# Patient Record
Sex: FEMALE | Race: WHITE | NOT HISPANIC OR LATINO | Employment: OTHER | URBAN - METROPOLITAN AREA
[De-identification: names, ages, dates, MRNs, and addresses within clinical notes are randomized per-mention and may not be internally consistent; named-entity substitution may affect disease eponyms.]

---

## 2023-03-29 ENCOUNTER — OFFICE VISIT (OUTPATIENT)
Dept: ORTHOPEDICS | Facility: CLINIC | Age: 73
End: 2023-03-29
Payer: MEDICARE

## 2023-03-29 VITALS — WEIGHT: 185 LBS | HEIGHT: 63 IN | BODY MASS INDEX: 32.78 KG/M2

## 2023-03-29 DIAGNOSIS — M65.331 TRIGGER MIDDLE FINGER OF RIGHT HAND: ICD-10-CM

## 2023-03-29 DIAGNOSIS — G56.03 BILATERAL CARPAL TUNNEL SYNDROME: Primary | ICD-10-CM

## 2023-03-29 PROCEDURE — 99203 PR OFFICE/OUTPT VISIT, NEW, LEVL III, 30-44 MIN: ICD-10-PCS | Mod: ,,, | Performed by: ORTHOPAEDIC SURGERY

## 2023-03-29 PROCEDURE — 99203 OFFICE O/P NEW LOW 30 MIN: CPT | Mod: ,,, | Performed by: ORTHOPAEDIC SURGERY

## 2023-03-29 RX ORDER — SIMVASTATIN 10 MG/1
10 TABLET, FILM COATED ORAL NIGHTLY
COMMUNITY

## 2023-03-29 RX ORDER — CARVEDILOL 25 MG/1
25 TABLET ORAL 2 TIMES DAILY
COMMUNITY

## 2023-03-29 RX ORDER — SPIRONOLACTONE 25 MG/1
12.5 TABLET ORAL DAILY
COMMUNITY

## 2023-03-29 RX ORDER — GUAIFENESIN AND PHENYLEPHRINE HCL 400; 10 MG/1; MG/1
1 TABLET ORAL ONCE AS NEEDED
COMMUNITY

## 2023-03-29 NOTE — PROGRESS NOTES
Chief Complaint:   Chief Complaint   Patient presents with    Left Wrist - Pain    Right Wrist - Pain    Wrist Pain     has been having bilateral wrist pain for about 2 years and has numbness and tingling, and on right hand the middle finger has trigger finger, has tried braces in the past and did not help       Consulting Physician: No ref. provider found    History of present illness:    she  is a pleasant 73 y.o. year old female with complaints of bilateral hand pain, numbness, and tingling.  Right is worse than the left.  It has been ongoing since .  She is also noticed a triggering of the right long finger.  She is tried anti-inflammatory medicines and bracing in the past.  She thinks it may be from her sewing activity.  The pain has begun Tinel wake her up at night.    Past Medical History:   Diagnosis Date    Hypertension        Past Surgical History:   Procedure Laterality Date     SECTION      77,81, 83       Current Outpatient Medications   Medication Sig    aspirin 81 mg Cap Take by mouth.    carvediloL (COREG) 25 MG tablet carvedilol 25 mg tablet    diphenhydrAMINE (BENADRYL) 50 MG tablet Take 50 mg by mouth nightly as needed for Itching.    omega-3s/dha/epa/fish oil/D3 (VITAMIN-D + OMEGA-3 ORAL) Vitamin D   [id number] ONCE WEEKLY    simvastatin (ZOCOR) 10 MG tablet simvastatin 10 mg tablet    spironolactone (ALDACTONE) 25 MG tablet spironolactone 25 mg tablet    turmeric root extract 500 mg Cap Take by mouth 1 (one) time if needed.     No current facility-administered medications for this visit.       Review of patient's allergies indicates:   Allergen Reactions    Iodinated contrast media      Patient states she is allergic to contrast dye (CT/MRI)       History reviewed. No pertinent family history.    Social History     Socioeconomic History    Marital status:    Tobacco Use    Smoking status: Never    Smokeless tobacco: Never   Substance and Sexual Activity    Alcohol use:  "Never    Drug use: Never    Sexual activity: Yes     Partners: Male       Review of Systems:    Constitution:   Denies chills, fever, and sweats.  HENT:   Denies headaches or blurry vision.  Cardiovascular:  Denies chest pain or irregular heart beat.  Respiratory:   Denies cough or shortness of breath.  Gastrointestinal:  Denies abdominal pain, nausea, or vomiting.  Musculoskeletal:   Denies muscle cramps.  Neurological:   Denies dizziness or focal weakness.  Psychiatric/Behavior: Normal mental status.  Hematology/Lymph:  Denies bleeding problem or easy bruising/bleeding.  Skin:    Denies rash or suspicious lesions.    Examination:    Vital Signs:    Vitals:    03/29/23 1017   Weight: 83.9 kg (185 lb)   Height: 5' 3" (1.6 m)   PainSc:   7       Body mass index is 32.77 kg/m².    Constitution:   Well-developed, well nourished patient in no acute distress.  Neurological:   Alert and oriented x 3 and cooperative to examination.     Psychiatric/Behavior: Normal mental status.  Respiratory:   No shortness of breath.  Eyes:    Extraoccular muscles intact  Skin:    No scars, rash or suspicious lesions.    MSK:   Bilateral hand exam shows normal-appearing hand with some mild atrophy to a right thenar eminence.  She is full range of motion.  She is active triggering of her long finger.  She is a positive Phalen's test and a positive carpal tunnel compression test.  Her sensation is intact to light touch.  +radial pulse       Assessment: Bilateral carpal tunnel syndrome    Trigger middle finger of right hand        Plan:  We will get bilateral upper extremity EMGs.  I will see her back after for review    "

## 2023-04-17 ENCOUNTER — APPOINTMENT (OUTPATIENT)
Dept: LAB | Facility: HOSPITAL | Age: 73
End: 2023-04-17
Attending: ORTHOPAEDIC SURGERY
Payer: MEDICARE

## 2023-04-17 ENCOUNTER — OFFICE VISIT (OUTPATIENT)
Dept: ORTHOPEDICS | Facility: CLINIC | Age: 73
End: 2023-04-17
Payer: MEDICARE

## 2023-04-17 DIAGNOSIS — G56.01 CARPAL TUNNEL SYNDROME OF RIGHT WRIST: Primary | ICD-10-CM

## 2023-04-17 DIAGNOSIS — M65.331 TRIGGER MIDDLE FINGER OF RIGHT HAND: ICD-10-CM

## 2023-04-17 LAB
ANION GAP SERPL CALC-SCNC: 8 MEQ/L
BUN SERPL-MCNC: 16.8 MG/DL (ref 9.8–20.1)
CALCIUM SERPL-MCNC: 10 MG/DL (ref 8.4–10.2)
CHLORIDE SERPL-SCNC: 106 MMOL/L (ref 98–107)
CO2 SERPL-SCNC: 27 MMOL/L (ref 23–31)
CREAT SERPL-MCNC: 1 MG/DL (ref 0.55–1.02)
CREAT/UREA NIT SERPL: 17
GFR SERPLBLD CREATININE-BSD FMLA CKD-EPI: 60 MLS/MIN/1.73/M2
GLUCOSE SERPL-MCNC: 98 MG/DL (ref 82–115)
POTASSIUM SERPL-SCNC: 4.5 MMOL/L (ref 3.5–5.1)
SODIUM SERPL-SCNC: 141 MMOL/L (ref 136–145)

## 2023-04-17 PROCEDURE — 93010 ELECTROCARDIOGRAM REPORT: CPT | Mod: ,,, | Performed by: STUDENT IN AN ORGANIZED HEALTH CARE EDUCATION/TRAINING PROGRAM

## 2023-04-17 PROCEDURE — 93005 EKG 12-LEAD: ICD-10-PCS | Mod: ,,, | Performed by: ORTHOPAEDIC SURGERY

## 2023-04-17 PROCEDURE — 93010 EKG 12-LEAD: ICD-10-PCS | Mod: ,,, | Performed by: STUDENT IN AN ORGANIZED HEALTH CARE EDUCATION/TRAINING PROGRAM

## 2023-04-17 PROCEDURE — 99213 PR OFFICE/OUTPT VISIT, EST, LEVL III, 20-29 MIN: ICD-10-PCS | Mod: ,,, | Performed by: ORTHOPAEDIC SURGERY

## 2023-04-17 PROCEDURE — 99213 OFFICE O/P EST LOW 20 MIN: CPT | Mod: ,,, | Performed by: ORTHOPAEDIC SURGERY

## 2023-04-17 PROCEDURE — 93005 ELECTROCARDIOGRAM TRACING: CPT | Mod: ,,, | Performed by: ORTHOPAEDIC SURGERY

## 2023-04-17 RX ORDER — SODIUM CHLORIDE 9 MG/ML
INJECTION, SOLUTION INTRAVENOUS CONTINUOUS
Status: CANCELLED | OUTPATIENT
Start: 2023-04-17

## 2023-04-17 NOTE — H&P (VIEW-ONLY)
Chief Complaint:   Chief Complaint   Patient presents with    Left Hand - Results    Right Hand - Results       Consulting Physician: No ref. provider found    History of present illness:    she  is a pleasant 73 y.o. year old female with complaints of bilateral hand pain, numbness, and tingling.  Right is worse than the left.  It has been ongoing since .  She is also noticed a triggering of the right long finger.  She is tried anti-inflammatory medicines and bracing in the past.  She thinks it may be from her sewing activity.  The pain has begun Tinel wake her up at night.    She returns today status post EMG.    Past Medical History:   Diagnosis Date    Hypertension        Past Surgical History:   Procedure Laterality Date     SECTION      77,81, 83       Current Outpatient Medications   Medication Sig    aspirin 81 mg Cap Take by mouth.    carvediloL (COREG) 25 MG tablet carvedilol 25 mg tablet    diphenhydrAMINE (BENADRYL) 50 MG tablet Take 50 mg by mouth nightly as needed for Itching.    omega-3s/dha/epa/fish oil/D3 (VITAMIN-D + OMEGA-3 ORAL) Vitamin D   [id number] ONCE WEEKLY    simvastatin (ZOCOR) 10 MG tablet simvastatin 10 mg tablet    spironolactone (ALDACTONE) 25 MG tablet spironolactone 25 mg tablet    turmeric root extract 500 mg Cap Take by mouth 1 (one) time if needed.     No current facility-administered medications for this visit.       Review of patient's allergies indicates:   Allergen Reactions    Iodinated contrast media      Patient states she is allergic to contrast dye (CT/MRI)       Family History   Problem Relation Age of Onset    No Known Problems Mother     No Known Problems Father        Social History     Socioeconomic History    Marital status:    Tobacco Use    Smoking status: Never    Smokeless tobacco: Never   Substance and Sexual Activity    Alcohol use: Never    Drug use: Never    Sexual activity: Yes     Partners: Male       Review of  Systems:    Constitution:   Denies chills, fever, and sweats.  HENT:   Denies headaches or blurry vision.  Cardiovascular:  Denies chest pain or irregular heart beat.  Respiratory:   Denies cough or shortness of breath.  Gastrointestinal:  Denies abdominal pain, nausea, or vomiting.  Musculoskeletal:   Denies muscle cramps.  Neurological:   Denies dizziness or focal weakness.  Psychiatric/Behavior: Normal mental status.  Hematology/Lymph:  Denies bleeding problem or easy bruising/bleeding.  Skin:    Denies rash or suspicious lesions.    Examination:    Vital Signs:    There were no vitals filed for this visit.      There is no height or weight on file to calculate BMI.    Constitution:   Well-developed, well nourished patient in no acute distress.  Neurological:   Alert and oriented x 3 and cooperative to examination.     Psychiatric/Behavior: Normal mental status.  Respiratory:   No shortness of breath.  Eyes:    Extraoccular muscles intact  Skin:    No scars, rash or suspicious lesions.    MSK:   Bilateral hand exam shows normal-appearing hand with some mild atrophy to a right thenar eminence.  She is full range of motion.  She is active triggering of her long finger.  She is a positive Phalen's test and a positive carpal tunnel compression test.  Her sensation is intact to light touch.  +radial pulse       Assessment: Carpal tunnel syndrome of right wrist  -     Full code; Standing  -     Place in Outpatient; Standing  -     Case Request Operating Room: RELEASE, CARPAL TUNNEL, RELEASE, TRIGGER FINGER  -     Vital signs; Standing  -     Cleanse with Chlorhexidine (CHG); Standing  -     Diet NPO; Standing  -     Chlorohexidine Gluconate Bath; Standing  -     Basic metabolic panel; Standing  -     EKG 12-lead; Standing    Trigger middle finger of right hand  -     Full code; Standing  -     Place in Outpatient; Standing  -     Case Request Operating Room: RELEASE, CARPAL TUNNEL, RELEASE, TRIGGER FINGER  -     Vital  signs; Standing  -     Cleanse with Chlorhexidine (CHG); Standing  -     Diet NPO; Standing  -     Chlorohexidine Gluconate Bath; Standing  -     Basic metabolic panel; Standing  -     EKG 12-lead; Standing    Other orders  -     0.9%  NaCl infusion  -     IP VTE LOW RISK PATIENT; Standing  -     ceFAZolin (ANCEF) 2 g in dextrose 5 % (D5W) 50 mL IVPB        Plan:  I recommend surgical intervention:  Right carpal tunnel release, long finger trigger release.  We discussed the details of the procedure and expected postoperative course.  We discussed the benefits of surgery which be to decrease her pain and increase her function.  We discussed the risks of surgery which is small but could be significant if she has continued pain, stiffness, infection, or nerve injury.  After discussion she would like to proceed.  Plans for surgery May for

## 2023-04-17 NOTE — PROGRESS NOTES
Chief Complaint:   Chief Complaint   Patient presents with    Left Hand - Results    Right Hand - Results       Consulting Physician: No ref. provider found    History of present illness:    she  is a pleasant 73 y.o. year old female with complaints of bilateral hand pain, numbness, and tingling.  Right is worse than the left.  It has been ongoing since .  She is also noticed a triggering of the right long finger.  She is tried anti-inflammatory medicines and bracing in the past.  She thinks it may be from her sewing activity.  The pain has begun Tinel wake her up at night.    She returns today status post EMG.    Past Medical History:   Diagnosis Date    Hypertension        Past Surgical History:   Procedure Laterality Date     SECTION      77,81, 83       Current Outpatient Medications   Medication Sig    aspirin 81 mg Cap Take by mouth.    carvediloL (COREG) 25 MG tablet carvedilol 25 mg tablet    diphenhydrAMINE (BENADRYL) 50 MG tablet Take 50 mg by mouth nightly as needed for Itching.    omega-3s/dha/epa/fish oil/D3 (VITAMIN-D + OMEGA-3 ORAL) Vitamin D   [id number] ONCE WEEKLY    simvastatin (ZOCOR) 10 MG tablet simvastatin 10 mg tablet    spironolactone (ALDACTONE) 25 MG tablet spironolactone 25 mg tablet    turmeric root extract 500 mg Cap Take by mouth 1 (one) time if needed.     No current facility-administered medications for this visit.       Review of patient's allergies indicates:   Allergen Reactions    Iodinated contrast media      Patient states she is allergic to contrast dye (CT/MRI)       Family History   Problem Relation Age of Onset    No Known Problems Mother     No Known Problems Father        Social History     Socioeconomic History    Marital status:    Tobacco Use    Smoking status: Never    Smokeless tobacco: Never   Substance and Sexual Activity    Alcohol use: Never    Drug use: Never    Sexual activity: Yes     Partners: Male       Review of  Systems:    Constitution:   Denies chills, fever, and sweats.  HENT:   Denies headaches or blurry vision.  Cardiovascular:  Denies chest pain or irregular heart beat.  Respiratory:   Denies cough or shortness of breath.  Gastrointestinal:  Denies abdominal pain, nausea, or vomiting.  Musculoskeletal:   Denies muscle cramps.  Neurological:   Denies dizziness or focal weakness.  Psychiatric/Behavior: Normal mental status.  Hematology/Lymph:  Denies bleeding problem or easy bruising/bleeding.  Skin:    Denies rash or suspicious lesions.    Examination:    Vital Signs:    There were no vitals filed for this visit.      There is no height or weight on file to calculate BMI.    Constitution:   Well-developed, well nourished patient in no acute distress.  Neurological:   Alert and oriented x 3 and cooperative to examination.     Psychiatric/Behavior: Normal mental status.  Respiratory:   No shortness of breath.  Eyes:    Extraoccular muscles intact  Skin:    No scars, rash or suspicious lesions.    MSK:   Bilateral hand exam shows normal-appearing hand with some mild atrophy to a right thenar eminence.  She is full range of motion.  She is active triggering of her long finger.  She is a positive Phalen's test and a positive carpal tunnel compression test.  Her sensation is intact to light touch.  +radial pulse       Assessment: Carpal tunnel syndrome of right wrist  -     Full code; Standing  -     Place in Outpatient; Standing  -     Case Request Operating Room: RELEASE, CARPAL TUNNEL, RELEASE, TRIGGER FINGER  -     Vital signs; Standing  -     Cleanse with Chlorhexidine (CHG); Standing  -     Diet NPO; Standing  -     Chlorohexidine Gluconate Bath; Standing  -     Basic metabolic panel; Standing  -     EKG 12-lead; Standing    Trigger middle finger of right hand  -     Full code; Standing  -     Place in Outpatient; Standing  -     Case Request Operating Room: RELEASE, CARPAL TUNNEL, RELEASE, TRIGGER FINGER  -     Vital  signs; Standing  -     Cleanse with Chlorhexidine (CHG); Standing  -     Diet NPO; Standing  -     Chlorohexidine Gluconate Bath; Standing  -     Basic metabolic panel; Standing  -     EKG 12-lead; Standing    Other orders  -     0.9%  NaCl infusion  -     IP VTE LOW RISK PATIENT; Standing  -     ceFAZolin (ANCEF) 2 g in dextrose 5 % (D5W) 50 mL IVPB        Plan:  I recommend surgical intervention:  Right carpal tunnel release, long finger trigger release.  We discussed the details of the procedure and expected postoperative course.  We discussed the benefits of surgery which be to decrease her pain and increase her function.  We discussed the risks of surgery which is small but could be significant if she has continued pain, stiffness, infection, or nerve injury.  After discussion she would like to proceed.  Plans for surgery May for

## 2023-05-03 ENCOUNTER — ANESTHESIA EVENT (OUTPATIENT)
Dept: SURGERY | Facility: HOSPITAL | Age: 73
End: 2023-05-03
Payer: MEDICARE

## 2023-05-04 ENCOUNTER — ANESTHESIA (OUTPATIENT)
Dept: SURGERY | Facility: HOSPITAL | Age: 73
End: 2023-05-04
Payer: MEDICARE

## 2023-05-04 ENCOUNTER — HOSPITAL ENCOUNTER (OUTPATIENT)
Facility: HOSPITAL | Age: 73
Discharge: HOME | End: 2023-05-04
Attending: ORTHOPAEDIC SURGERY | Admitting: ORTHOPAEDIC SURGERY
Payer: MEDICARE

## 2023-05-04 DIAGNOSIS — G56.01 CARPAL TUNNEL SYNDROME OF RIGHT WRIST: Primary | ICD-10-CM

## 2023-05-04 DIAGNOSIS — M65.331 TRIGGER MIDDLE FINGER OF RIGHT HAND: ICD-10-CM

## 2023-05-04 PROCEDURE — 64415 NJX AA&/STRD BRCH PLXS IMG: CPT | Mod: 59,RT,, | Performed by: STUDENT IN AN ORGANIZED HEALTH CARE EDUCATION/TRAINING PROGRAM

## 2023-05-04 PROCEDURE — D9220A PRA ANESTHESIA: Mod: CRNA,,,

## 2023-05-04 PROCEDURE — 36000706: Performed by: ORTHOPAEDIC SURGERY

## 2023-05-04 PROCEDURE — 26055 PR INCISE FINGER TENDON SHEATH: ICD-10-PCS | Mod: F7,,, | Performed by: ORTHOPAEDIC SURGERY

## 2023-05-04 PROCEDURE — D9220A PRA ANESTHESIA: ICD-10-PCS | Mod: CRNA,,,

## 2023-05-04 PROCEDURE — 25000003 PHARM REV CODE 250

## 2023-05-04 PROCEDURE — D9220A PRA ANESTHESIA: ICD-10-PCS | Mod: ANES,,, | Performed by: STUDENT IN AN ORGANIZED HEALTH CARE EDUCATION/TRAINING PROGRAM

## 2023-05-04 PROCEDURE — 64721 CARPAL TUNNEL SURGERY: CPT | Mod: 51,RT,, | Performed by: ORTHOPAEDIC SURGERY

## 2023-05-04 PROCEDURE — 37000009 HC ANESTHESIA EA ADD 15 MINS: Performed by: ORTHOPAEDIC SURGERY

## 2023-05-04 PROCEDURE — 37000008 HC ANESTHESIA 1ST 15 MINUTES: Performed by: ORTHOPAEDIC SURGERY

## 2023-05-04 PROCEDURE — 71000016 HC POSTOP RECOV ADDL HR: Performed by: ORTHOPAEDIC SURGERY

## 2023-05-04 PROCEDURE — 71000015 HC POSTOP RECOV 1ST HR: Performed by: ORTHOPAEDIC SURGERY

## 2023-05-04 PROCEDURE — D9220A PRA ANESTHESIA: Mod: ANES,,, | Performed by: STUDENT IN AN ORGANIZED HEALTH CARE EDUCATION/TRAINING PROGRAM

## 2023-05-04 PROCEDURE — 26055 INCISE FINGER TENDON SHEATH: CPT | Mod: F7,,, | Performed by: ORTHOPAEDIC SURGERY

## 2023-05-04 PROCEDURE — 64415 RIGHT SINGLE SHOT SUPRACLAVICULAR BLOCK: ICD-10-PCS | Mod: 59,RT,, | Performed by: STUDENT IN AN ORGANIZED HEALTH CARE EDUCATION/TRAINING PROGRAM

## 2023-05-04 PROCEDURE — 63600175 PHARM REV CODE 636 W HCPCS: Performed by: STUDENT IN AN ORGANIZED HEALTH CARE EDUCATION/TRAINING PROGRAM

## 2023-05-04 PROCEDURE — 36000707: Performed by: ORTHOPAEDIC SURGERY

## 2023-05-04 PROCEDURE — 64721 PR REVISE MEDIAN N/CARPAL TUNNEL SURG: ICD-10-PCS | Mod: 51,RT,, | Performed by: ORTHOPAEDIC SURGERY

## 2023-05-04 PROCEDURE — 63600175 PHARM REV CODE 636 W HCPCS

## 2023-05-04 PROCEDURE — 64415 NJX AA&/STRD BRCH PLXS IMG: CPT | Mod: 59 | Performed by: STUDENT IN AN ORGANIZED HEALTH CARE EDUCATION/TRAINING PROGRAM

## 2023-05-04 RX ORDER — CEFAZOLIN SODIUM 1 G/3ML
INJECTION, POWDER, FOR SOLUTION INTRAMUSCULAR; INTRAVENOUS
Status: DISCONTINUED | OUTPATIENT
Start: 2023-05-04 | End: 2023-05-04

## 2023-05-04 RX ORDER — MIDAZOLAM HYDROCHLORIDE 1 MG/ML
2 INJECTION INTRAMUSCULAR; INTRAVENOUS EVERY 5 MIN PRN
Status: DISCONTINUED | OUTPATIENT
Start: 2023-05-04 | End: 2023-05-04 | Stop reason: HOSPADM

## 2023-05-04 RX ORDER — CEFAZOLIN SODIUM 1 G/3ML
INJECTION, POWDER, FOR SOLUTION INTRAMUSCULAR; INTRAVENOUS
Status: COMPLETED
Start: 2023-05-04 | End: 2023-05-04

## 2023-05-04 RX ORDER — MORPHINE SULFATE 4 MG/ML
3 INJECTION, SOLUTION INTRAMUSCULAR; INTRAVENOUS
Status: DISCONTINUED | OUTPATIENT
Start: 2023-05-04 | End: 2023-05-04 | Stop reason: HOSPADM

## 2023-05-04 RX ORDER — PROPOFOL 10 MG/ML
VIAL (ML) INTRAVENOUS CONTINUOUS PRN
Status: DISCONTINUED | OUTPATIENT
Start: 2023-05-04 | End: 2023-05-04

## 2023-05-04 RX ORDER — ROPIVACAINE HYDROCHLORIDE 5 MG/ML
INJECTION, SOLUTION EPIDURAL; INFILTRATION; PERINEURAL
Status: DISCONTINUED
Start: 2023-05-04 | End: 2023-05-04 | Stop reason: WASHOUT

## 2023-05-04 RX ORDER — SODIUM CHLORIDE 0.9 % (FLUSH) 0.9 %
3 SYRINGE (ML) INJECTION EVERY 6 HOURS PRN
Status: DISCONTINUED | OUTPATIENT
Start: 2023-05-04 | End: 2023-05-04 | Stop reason: HOSPADM

## 2023-05-04 RX ORDER — KETOROLAC TROMETHAMINE 10 MG/1
10 TABLET, FILM COATED ORAL 3 TIMES DAILY
Qty: 15 TABLET | Refills: 0 | Status: ON HOLD | OUTPATIENT
Start: 2023-05-04 | End: 2023-06-01 | Stop reason: HOSPADM

## 2023-05-04 RX ORDER — SODIUM CHLORIDE 9 MG/ML
INJECTION, SOLUTION INTRAVENOUS CONTINUOUS
Status: DISCONTINUED | OUTPATIENT
Start: 2023-05-04 | End: 2023-05-04 | Stop reason: HOSPADM

## 2023-05-04 RX ORDER — ONDANSETRON 2 MG/ML
4 INJECTION INTRAMUSCULAR; INTRAVENOUS EVERY 12 HOURS PRN
Status: DISCONTINUED | OUTPATIENT
Start: 2023-05-04 | End: 2023-05-04 | Stop reason: HOSPADM

## 2023-05-04 RX ORDER — TRAMADOL HYDROCHLORIDE 50 MG/1
50 TABLET ORAL EVERY 4 HOURS PRN
Status: DISCONTINUED | OUTPATIENT
Start: 2023-05-04 | End: 2023-05-04 | Stop reason: HOSPADM

## 2023-05-04 RX ORDER — PROPOFOL 10 MG/ML
INJECTION, EMULSION INTRAVENOUS
Status: DISCONTINUED | OUTPATIENT
Start: 2023-05-04 | End: 2023-05-04

## 2023-05-04 RX ORDER — ONDANSETRON 2 MG/ML
INJECTION INTRAMUSCULAR; INTRAVENOUS
Status: DISCONTINUED | OUTPATIENT
Start: 2023-05-04 | End: 2023-05-04

## 2023-05-04 RX ORDER — DEXAMETHASONE SODIUM PHOSPHATE 4 MG/ML
INJECTION, SOLUTION INTRA-ARTICULAR; INTRALESIONAL; INTRAMUSCULAR; INTRAVENOUS; SOFT TISSUE
Status: DISCONTINUED | OUTPATIENT
Start: 2023-05-04 | End: 2023-05-04

## 2023-05-04 RX ORDER — HYDROCODONE BITARTRATE AND ACETAMINOPHEN 5; 325 MG/1; MG/1
1 TABLET ORAL EVERY 4 HOURS PRN
Status: DISCONTINUED | OUTPATIENT
Start: 2023-05-04 | End: 2023-05-04 | Stop reason: HOSPADM

## 2023-05-04 RX ADMIN — MIDAZOLAM HYDROCHLORIDE 2 MG: 1 INJECTION, SOLUTION INTRAMUSCULAR; INTRAVENOUS at 08:05

## 2023-05-04 RX ADMIN — CEFAZOLIN 2 G: 330 INJECTION, POWDER, FOR SOLUTION INTRAMUSCULAR; INTRAVENOUS at 09:05

## 2023-05-04 RX ADMIN — MEPIVACAINE HYDROCHLORIDE 30 ML: 15 INJECTION, SOLUTION EPIDURAL; INFILTRATION at 09:05

## 2023-05-04 RX ADMIN — ONDANSETRON HYDROCHLORIDE 4 MG: 2 SOLUTION INTRAMUSCULAR; INTRAVENOUS at 09:05

## 2023-05-04 RX ADMIN — PROPOFOL 125 MCG/KG/MIN: 10 INJECTION, EMULSION INTRAVENOUS at 09:05

## 2023-05-04 RX ADMIN — SODIUM CHLORIDE, SODIUM GLUCONATE, SODIUM ACETATE, POTASSIUM CHLORIDE AND MAGNESIUM CHLORIDE: 526; 502; 368; 37; 30 INJECTION, SOLUTION INTRAVENOUS at 09:05

## 2023-05-04 RX ADMIN — DEXAMETHASONE SODIUM PHOSPHATE 4 MG: 4 INJECTION, SOLUTION INTRA-ARTICULAR; INTRALESIONAL; INTRAMUSCULAR; INTRAVENOUS; SOFT TISSUE at 09:05

## 2023-05-04 RX ADMIN — PROPOFOL 40 MG: 10 INJECTION, EMULSION INTRAVENOUS at 09:05

## 2023-05-04 NOTE — ANESTHESIA PREPROCEDURE EVALUATION
2023  Esperanza Ochoa is a 73 y.o., female.    Other Medical History   Hypertension Insomnia   High cholesterol Carpal tunnel syndrome   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, bilateral      Surgical History   SECTION COLONOSCOPY       Pre-op Assessment    I have reviewed the Patient Summary Reports.     I have reviewed the Nursing Notes. I have reviewed the NPO Status.   I have reviewed the Medications.     Review of Systems  Anesthesia Hx:   Denies Personal Hx of Anesthesia complications.   Cardiovascular:   Hypertension hyperlipidemia    Pulmonary:  Pulmonary Normal    Hepatic/GI:  Hepatic/GI Normal    Musculoskeletal:   Arthritis     Neurological:   Neuromuscular Disease, (cts)    Endocrine:  Obesity / BMI > 30  Psych:  Psychiatric Normal           Physical Exam  General: Well nourished, Cooperative and Alert    Airway:  Mallampati: II   Mouth Opening: Normal  TM Distance: Normal  Tongue: Normal    Chest/Lungs:  Clear to auscultation    Heart:  Rate: Normal        Anesthesia Plan  Type of Anesthesia, risks & benefits discussed:    Anesthesia Type: Regional  Intra-op Monitoring Plan: Standard ASA Monitors  Post Op Pain Control Plan: multimodal analgesia and peripheral nerve block  Induction:  IV  Informed Consent: Informed consent signed with the Patient and all parties understand the risks and agree with anesthesia plan.  All questions answered.   ASA Score: 2  Day of Surgery Review of History & Physical: H&P Update referred to the surgeon/provider.    Ready For Surgery From Anesthesia Perspective.     .

## 2023-05-04 NOTE — ANESTHESIA POSTPROCEDURE EVALUATION
Anesthesia Post Evaluation    Patient: Esperanza Ochoa    Procedure(s) Performed: Procedure(s) (LRB):  RELEASE, CARPAL TUNNEL (Right)  RELEASE, TRIGGER FINGER (Right)    Final Anesthesia Type: regional      Patient location during evaluation: PACU  Patient participation: Yes- Able to Participate  Level of consciousness: awake and alert  Post-procedure vital signs: reviewed and stable  Pain management: adequate  Airway patency: patent    PONV status at discharge: No PONV  Anesthetic complications: no      Respiratory status: unassisted  Hydration status: euvolemic  Follow-up not needed.          Vitals Value Taken Time   /82 05/04/23 1131   Temp 35.9 °C (96.6 °F) 05/04/23 1100   Pulse 60 05/04/23 1137   Resp 18 05/04/23 1115   SpO2 95 % 05/04/23 1137   Vitals shown include unvalidated device data.      No case tracking events are documented in the log.      Pain/Marta Score: Modified Marta Score: 19 (5/4/2023 11:50 AM)

## 2023-05-04 NOTE — TRANSFER OF CARE
"Anesthesia Transfer of Care Note    Patient: Esperanza Ochoa    Procedure(s) Performed: Procedure(s) (LRB):  RELEASE, CARPAL TUNNEL (Right)  RELEASE, TRIGGER FINGER (Right)    Patient location: OPS    Anesthesia Type: general    Transport from OR: Transported from OR on 2-3 L/min O2 by NC with adequate spontaneous ventilation    Post pain: adequate analgesia    Post assessment: no apparent anesthetic complications    Post vital signs: stable    Level of consciousness: awake    Nausea/Vomiting: no nausea/vomiting    Complications: none    Transfer of care protocol was followedComments: Detailed report with handoff to licensed provider complete      Last vitals:   Visit Vitals  /82   Pulse 89   Temp 35.8 °C (96.5 °F) (Tympanic)   Resp 15   Ht 5' 3" (1.6 m)   Wt 85.9 kg (189 lb 6 oz)   SpO2 99%   Breastfeeding No   BMI 33.55 kg/m²     "

## 2023-05-04 NOTE — OR NURSING
0858  procedure time out performed for rt supraclav block, all agree  0901  started  0904 U/s guided Rt supraclav block completed, pt kiki well, vss, resp full and regular, continuous monitoring.

## 2023-05-04 NOTE — PLAN OF CARE
Pt ready for discharge. Pt tolerated procedure well.  Problem: Adult Inpatient Plan of Care  Goal: Plan of Care Review  Outcome: Met  Goal: Patient-Specific Goal (Individualized)  Outcome: Met  Goal: Absence of Hospital-Acquired Illness or Injury  Outcome: Met  Goal: Optimal Comfort and Wellbeing  Outcome: Met  Goal: Readiness for Transition of Care  Outcome: Met

## 2023-05-04 NOTE — PATIENT INSTRUCTIONS
[facility]  Omer Mcduffie Jr., MD  [address], [address]  [place] [address]  [phone], fax: [phone]    CARPAL TUNNEL RELEASE    DIET:  Following general anesthesia, start with clear liquids to decrease chances of nausea.  Begin with water, coffee, tea, ginger ale, sprite, or apple juice.  If tolerated, advance to Jell-o, soup, crackers, or toast.  Once these are tolerated, advance to a regular diet.    DRESSING:  Keep the splint clean and dry.  Do not remove the splint until the first follow up appointment.    SHOWERING:  You may shower after surgery.  Keep the splint clean and dry during showers.    ACTIVITY:            -  Elevate the surgical site as upright as possible using extra pillows as needed.  Do this for the first few days after surgery to help decrease pain and swelling.            -  Ice should be applied to the surgical site for 30 minutes, 5-6 times per day, for the 1st week to help decrease pain and swelling.      PAIN MEDICATION:       -  Use the Percocet as prescribed for pain after surgery.  Pain medicine can cause nausea and vomiting, especially on an empty stomach.       -  In addition, you can take Ketorolac as prescribed or Iburpofen 200 mg, 4 pills every 8 hours.  Ketorolac or Iburpofen medicine can irritate your stomach or cause heartburn.  If this happens to you, stop taking the medicine.       -  Ice and elevation are more useful than pain medicine for surgical pain.  If you are having too much pain or discomfort, try more ice and higher elevation.       -  Do NOT drink alcohol while taking pain medication.    BLOOD CLOT PREVENTION:  If you are aware that you are at high risk for blood blots, notify your physician.  In general, you should walk s much as possible after surgery to increase blood circulation throughout your body.     DRIVING OR FLYING:  In general, you should NOT drive while wearing a sling  You must NEVER drive while  taking narcotic pain medication  You may ride in a car, take a train, or fly once you feel comfortable    PHYSICAL THERAPY:  You will not start physical therapy until after your first follow up appointment.    WORK OR SCHOOL:  You may return to an office job or school whenver comfortable.  Most patients return ~ 1 week after surgery.  For more active jobs that require extended walking, squatting, or lifting, you can wait until after your follow up appointment.  Any other types of jobs should be discussed with Dr. Mcduffie to determine a date for return to work.    PROBLEMS TO REPORT:       1.  Fever greater than 102 F       2.  Incision that is very red and/or draining pus       3.  Unable to urinate within 8 hours of surgery (a rare effect of being put to sleep for surgery)  Calls should be directed to the clinic:  [phone]    RETURN APPOINTMENT:  To confirm or reschedule your appointment, call [phone]

## 2023-05-04 NOTE — ANESTHESIA PROCEDURE NOTES
Right single shot supraclavicular block    Patient location during procedure: pre-op   Block not for primary anesthetic.  Reason for block: at surgeon's request and post-op pain management   Post-op Pain Location: right wrist pain   Start time: 5/4/2023 9:04 AM  Timeout: 5/4/2023 9:04 AM   End time: 5/4/2023 9:06 AM    Staffing  Authorizing Provider: Ashish Garcia MD  Performing Provider: Ashish Garcia MD    Preanesthetic Checklist  Completed: patient identified, IV checked, site marked, risks and benefits discussed, surgical consent, monitors and equipment checked, pre-op evaluation and timeout performed  Peripheral Block  Patient position: supine  Prep: ChloraPrep  Patient monitoring: heart rate, cardiac monitor, continuous pulse ox, continuous capnometry and frequent blood pressure checks  Block type: supraclavicular  Laterality: right  Injection technique: single shot  Needle  Needle type: Stimuplex   Needle gauge: 20 G  Needle length: 4 in  Needle localization: anatomical landmarks and ultrasound guidance   -ultrasound image captured on disc.  Assessment  Injection assessment: negative aspiration, negative parasthesia and local visualized surrounding nerve  Paresthesia pain: none  Heart rate change: no  Slow fractionated injection: yes    Medications:    Medications: mepivacaine (CARBOCAINE) injection 15 mg/mL (1.5%) - Perineural   30 mL - 5/4/2023 9:06:00 AM    Additional Notes  Good view of artery and first rib.  Needle guided adjacent to artery via corner pocket technique and local dosed without paresthesias.  Needle then moved anteriorly to plexus and local again dosed without paresthesias.  No complications.  Good perineural spread noted.  VSS.  [facility] RN monitoring vitals throughout procedure.  Patient tolerated procedure well.

## 2023-05-04 NOTE — DISCHARGE SUMMARY
[facility] - Periop Services  Discharge Note  Short Stay    Procedure(s) (LRB):  RELEASE, CARPAL TUNNEL (Right)  RELEASE, TRIGGER FINGER (Right)      OUTCOME: Patient tolerated treatment/procedure well without complication and is now ready for discharge.    DISPOSITION: Home or Self Care    FINAL DIAGNOSIS:  <principal problem not specified>    FOLLOWUP: In clinic    DISCHARGE INSTRUCTIONS:  No discharge procedures on file.     TIME SPENT ON DISCHARGE: 10 minutes

## 2023-05-04 NOTE — OP NOTE
Date of Service: 05/04/2023    Preoperative diagnosis: Right Carpal tunnel syndrome, long finger trigger    Postoperative diagnosis: Right Carpal tunnel syndrome, long finger trigger    Attending surgeon: Omer Mcduffie MD    Assistant: Cherry Angulo, nurse practitioner.  Ms. Angulo was essential in manipulating the hand while perform the release, retraction and nerve protection, and closure.    Procedure: Right Carpal tunnel release, long finger trigger release     Anesthesia: General plus monitored care    Estimated blood loss: Minimal    Tourniquet time:  20 Minutes    Complications: None    History of present illness: Esperanza is a pleasant 73 y.o.-year-old who has had persisted numbness tingling and shooting pain into the hand.  Subjective and objective signs of carpal tunnel syndrome were observed.  After failing conservative measures, I recommended open carpal tunnel release.  The risks, benefits, and alternatives to therapy were presented.  The patient elects to proceed    Procedure: Esperanza was initially seen in the preoperative unit where a history and physical were reviewed without change.  The operative extremity was marked.  Consents were reviewed.  All questions were answered.  The patient was then taken to the operating room placed supine on the operative table where monitored care was undertaken.  I injected local anesthesia around the incision.  Perioperative antibiotics were administered.  The operative extremity was prepped and draped in sterile fashion.  An attending lead timeout confirmed the operative side; I then began the procedure.    I started with the trigger finger release.  I made an incision in line with the long finger.  I sharply incised through skin and spread through subcutaneous tissue.  I was able to identify the A1 pulley and split in line with the tendon.  I inspected the tendon and it was free and intact.  I then closed the skin.    I began my incision at the intersection of  Booth's line and the radial side of the ring finger.  I sharply incised through skin and subcutaneous tissue.  I split through the palmar fascia.  I then came down to the transverse carpal ligament.  I first incised this with a 15 blade.  I cleaned the underside of the ligament using a freer elevator.  I then completed the incision using scissors while visualizing the nerve.  I incised it distally until I encountered yellow fat.  I incised proximally until I was in the forearm fascia.  I inspected the nerve and there was no lesions.  There was no abnormal contents of the carpal tunnel.  I irrigated out the incision.  I closed the incision with nylon sutures.  The patient was placed into a resting hand splint.  The patient was awoken from anesthesia and transferred to the postop unit good condition.

## 2023-05-04 NOTE — DISCHARGE SUMMARY
[facility] Orthopaedics - Periop Services  Discharge Note  Short Stay    Procedure(s) (LRB):  RELEASE, CARPAL TUNNEL (Right)  RELEASE, TRIGGER FINGER (Right)      OUTCOME: Patient tolerated treatment/procedure well without complication and is now ready for discharge.    DISPOSITION: Home or Self Care    FINAL DIAGNOSIS:  <principal problem not specified>    FOLLOWUP: In clinic    DISCHARGE INSTRUCTIONS:    Discharge Procedure Orders   Diet general     Keep surgical extremity elevated     Ice to affected area     Remove dressing in 72 hours     Call MD for:  temperature >100.4     Call MD for:  persistent nausea and vomiting     Call MD for:  severe uncontrolled pain     Call MD for:  difficulty breathing, headache or visual disturbances     Call MD for:  redness, tenderness, or signs of infection (pain, swelling, redness, odor or green/yellow discharge around incision site)     Call MD for:  hives     Call MD for:  persistent dizziness or light-headedness     Call MD for:  extreme fatigue     Activity as tolerated     Weight bearing as tolerated        TIME SPENT ON DISCHARGE: 10 minutes

## 2023-05-05 VITALS
DIASTOLIC BLOOD PRESSURE: 92 MMHG | HEIGHT: 63 IN | WEIGHT: 189.38 LBS | SYSTOLIC BLOOD PRESSURE: 131 MMHG | OXYGEN SATURATION: 95 % | HEART RATE: 61 BPM | TEMPERATURE: 97 F | RESPIRATION RATE: 18 BRPM | BODY MASS INDEX: 33.55 KG/M2

## 2023-05-15 ENCOUNTER — OFFICE VISIT (OUTPATIENT)
Dept: ORTHOPEDICS | Facility: CLINIC | Age: 73
End: 2023-05-15
Payer: MEDICARE

## 2023-05-15 VITALS — BODY MASS INDEX: 33.49 KG/M2 | WEIGHT: 189 LBS | HEIGHT: 63 IN

## 2023-05-15 DIAGNOSIS — Z98.890 S/P TRIGGER FINGER RELEASE: ICD-10-CM

## 2023-05-15 DIAGNOSIS — Z98.890 S/P CARPAL TUNNEL RELEASE: Primary | ICD-10-CM

## 2023-05-15 DIAGNOSIS — G56.02 LEFT CARPAL TUNNEL SYNDROME: ICD-10-CM

## 2023-05-15 PROCEDURE — 99024 PR POST-OP FOLLOW-UP VISIT: ICD-10-PCS | Mod: POP,,, | Performed by: NURSE PRACTITIONER

## 2023-05-15 PROCEDURE — 99024 POSTOP FOLLOW-UP VISIT: CPT | Mod: POP,,, | Performed by: NURSE PRACTITIONER

## 2023-05-15 RX ORDER — SODIUM CHLORIDE 9 MG/ML
INJECTION, SOLUTION INTRAVENOUS CONTINUOUS
Status: CANCELLED | OUTPATIENT
Start: 2023-05-15

## 2023-05-15 NOTE — H&P (VIEW-ONLY)
Chief Complaint:   Chief Complaint   Patient presents with    Right Wrist - Post-op Evaluation    Right Hand - Post-op Evaluation    Post-op Evaluation     2 wk sp right carpal tunnel release and right long trigger finer release sx 5/4/23 GL 8/2/23, patient presents today in splint and has no complaints       History of present illness:  5/4/23: Right Carpal tunnel release, long finger trigger release    She returns today. Her Numbness, tingling, and trigger finger have resolved.  She denies pain.    She would like to schedule a left carpal tunnel release.    Musculoskeletal:   Right hand incisions healing.  Without erythema, drainage, or signs of infection.  Distally neurovascular intact.    Left hand numbness and tingling in the thumb through long fingers.  +Tinel's and carpal tunnel compression.    Assessment: S/P carpal tunnel release    S/P trigger finger release    Left carpal tunnel syndrome        Plan:  Right hand doing well status post above. Sutures out today. Discussed with Dr. Mcduffie - kyle for left carpal tunnel release. We discussed the details of the procedure and expected postoperative course.  We discussed the benefits of surgery which be to decrease her pain and increase her function.  We discussed the risks of surgery which is small but could be significant if she has continued pain, stiffness, infection, or nerve injury. After discussion she would like to proceed. Plan for surgery June 1st.

## 2023-05-31 ENCOUNTER — ANESTHESIA EVENT (OUTPATIENT)
Dept: SURGERY | Facility: HOSPITAL | Age: 73
End: 2023-05-31
Payer: MEDICARE

## 2023-06-01 ENCOUNTER — HOSPITAL ENCOUNTER (OUTPATIENT)
Facility: HOSPITAL | Age: 73
Discharge: HOME | End: 2023-06-01
Attending: ORTHOPAEDIC SURGERY | Admitting: ORTHOPAEDIC SURGERY
Payer: MEDICARE

## 2023-06-01 ENCOUNTER — ANESTHESIA (OUTPATIENT)
Dept: SURGERY | Facility: HOSPITAL | Age: 73
End: 2023-06-01
Payer: MEDICARE

## 2023-06-01 VITALS
WEIGHT: 188.94 LBS | OXYGEN SATURATION: 100 % | TEMPERATURE: 98 F | HEIGHT: 63 IN | DIASTOLIC BLOOD PRESSURE: 77 MMHG | BODY MASS INDEX: 33.48 KG/M2 | RESPIRATION RATE: 20 BRPM | HEART RATE: 60 BPM | SYSTOLIC BLOOD PRESSURE: 121 MMHG

## 2023-06-01 DIAGNOSIS — G56.02 LEFT CARPAL TUNNEL SYNDROME: Primary | ICD-10-CM

## 2023-06-01 DIAGNOSIS — I83.93 VARICOSE VEINS OF BOTH LOWER EXTREMITIES WITHOUT ULCER OR INFLAMMATION: Primary | ICD-10-CM

## 2023-06-01 DIAGNOSIS — Z98.890 S/P CARPAL TUNNEL RELEASE: ICD-10-CM

## 2023-06-01 PROCEDURE — 63600175 PHARM REV CODE 636 W HCPCS: Performed by: NURSE PRACTITIONER

## 2023-06-01 PROCEDURE — D9220A PRA ANESTHESIA: ICD-10-PCS | Mod: CRNA,,, | Performed by: NURSE ANESTHETIST, CERTIFIED REGISTERED

## 2023-06-01 PROCEDURE — 25000003 PHARM REV CODE 250: Performed by: NURSE PRACTITIONER

## 2023-06-01 PROCEDURE — 25000003 PHARM REV CODE 250: Performed by: ORTHOPAEDIC SURGERY

## 2023-06-01 PROCEDURE — 25000003 PHARM REV CODE 250: Performed by: ANESTHESIOLOGY

## 2023-06-01 PROCEDURE — 36000707: Performed by: ORTHOPAEDIC SURGERY

## 2023-06-01 PROCEDURE — 36000706: Performed by: ORTHOPAEDIC SURGERY

## 2023-06-01 PROCEDURE — 64721 PR REVISE MEDIAN N/CARPAL TUNNEL SURG: ICD-10-PCS | Mod: 79,LT,, | Performed by: ORTHOPAEDIC SURGERY

## 2023-06-01 PROCEDURE — 71000015 HC POSTOP RECOV 1ST HR: Performed by: ORTHOPAEDIC SURGERY

## 2023-06-01 PROCEDURE — 64721 CARPAL TUNNEL SURGERY: CPT | Mod: 79,LT,, | Performed by: ORTHOPAEDIC SURGERY

## 2023-06-01 PROCEDURE — D9220A PRA ANESTHESIA: Mod: CRNA,,, | Performed by: NURSE ANESTHETIST, CERTIFIED REGISTERED

## 2023-06-01 PROCEDURE — 25000003 PHARM REV CODE 250: Performed by: NURSE ANESTHETIST, CERTIFIED REGISTERED

## 2023-06-01 PROCEDURE — D9220A PRA ANESTHESIA: ICD-10-PCS | Mod: ANES,,, | Performed by: ANESTHESIOLOGY

## 2023-06-01 PROCEDURE — 37000008 HC ANESTHESIA 1ST 15 MINUTES: Performed by: ORTHOPAEDIC SURGERY

## 2023-06-01 PROCEDURE — 63600175 PHARM REV CODE 636 W HCPCS: Performed by: NURSE ANESTHETIST, CERTIFIED REGISTERED

## 2023-06-01 PROCEDURE — D9220A PRA ANESTHESIA: Mod: ANES,,, | Performed by: ANESTHESIOLOGY

## 2023-06-01 PROCEDURE — 37000009 HC ANESTHESIA EA ADD 15 MINS: Performed by: ORTHOPAEDIC SURGERY

## 2023-06-01 RX ORDER — MORPHINE SULFATE 4 MG/ML
3 INJECTION, SOLUTION INTRAMUSCULAR; INTRAVENOUS
Status: DISCONTINUED | OUTPATIENT
Start: 2023-06-01 | End: 2023-06-01 | Stop reason: HOSPADM

## 2023-06-01 RX ORDER — TRAMADOL HYDROCHLORIDE 50 MG/1
50 TABLET ORAL EVERY 4 HOURS PRN
Status: DISCONTINUED | OUTPATIENT
Start: 2023-06-01 | End: 2023-06-01 | Stop reason: HOSPADM

## 2023-06-01 RX ORDER — HYDROCODONE BITARTRATE AND ACETAMINOPHEN 5; 325 MG/1; MG/1
1 TABLET ORAL EVERY 4 HOURS PRN
Status: DISCONTINUED | OUTPATIENT
Start: 2023-06-01 | End: 2023-06-01 | Stop reason: HOSPADM

## 2023-06-01 RX ORDER — SODIUM CHLORIDE, SODIUM GLUCONATE, SODIUM ACETATE, POTASSIUM CHLORIDE AND MAGNESIUM CHLORIDE 30; 37; 368; 526; 502 MG/100ML; MG/100ML; MG/100ML; MG/100ML; MG/100ML
INJECTION, SOLUTION INTRAVENOUS CONTINUOUS
Status: DISCONTINUED | OUTPATIENT
Start: 2023-06-01 | End: 2023-06-01 | Stop reason: HOSPADM

## 2023-06-01 RX ORDER — PROPOFOL 10 MG/ML
VIAL (ML) INTRAVENOUS CONTINUOUS PRN
Status: DISCONTINUED | OUTPATIENT
Start: 2023-06-01 | End: 2023-06-01

## 2023-06-01 RX ORDER — ONDANSETRON 2 MG/ML
4 INJECTION INTRAMUSCULAR; INTRAVENOUS EVERY 12 HOURS PRN
Status: DISCONTINUED | OUTPATIENT
Start: 2023-06-01 | End: 2023-06-01 | Stop reason: HOSPADM

## 2023-06-01 RX ORDER — LIDOCAINE HYDROCHLORIDE 10 MG/ML
INJECTION INFILTRATION; PERINEURAL
Status: DISCONTINUED | OUTPATIENT
Start: 2023-06-01 | End: 2023-06-01 | Stop reason: HOSPADM

## 2023-06-01 RX ORDER — MIDAZOLAM HYDROCHLORIDE 1 MG/ML
INJECTION INTRAMUSCULAR; INTRAVENOUS
Status: DISCONTINUED | OUTPATIENT
Start: 2023-06-01 | End: 2023-06-01

## 2023-06-01 RX ORDER — BUPIVACAINE HYDROCHLORIDE 2.5 MG/ML
INJECTION, SOLUTION EPIDURAL; INFILTRATION; INTRACAUDAL
Status: DISCONTINUED
Start: 2023-06-01 | End: 2023-06-01 | Stop reason: WASHOUT

## 2023-06-01 RX ORDER — KETOROLAC TROMETHAMINE 10 MG/1
10 TABLET, FILM COATED ORAL 3 TIMES DAILY
Qty: 15 TABLET | Refills: 0 | Status: SHIPPED | OUTPATIENT
Start: 2023-06-01 | End: 2023-08-31

## 2023-06-01 RX ORDER — LIDOCAINE HYDROCHLORIDE 10 MG/ML
1 INJECTION, SOLUTION EPIDURAL; INFILTRATION; INTRACAUDAL; PERINEURAL ONCE
Status: DISCONTINUED | OUTPATIENT
Start: 2023-06-01 | End: 2023-06-01 | Stop reason: HOSPADM

## 2023-06-01 RX ORDER — SODIUM CHLORIDE 0.9 % (FLUSH) 0.9 %
3 SYRINGE (ML) INJECTION EVERY 6 HOURS PRN
Status: DISCONTINUED | OUTPATIENT
Start: 2023-06-01 | End: 2023-06-01 | Stop reason: HOSPADM

## 2023-06-01 RX ORDER — KETAMINE HYDROCHLORIDE 100 MG/ML
INJECTION, SOLUTION INTRAMUSCULAR; INTRAVENOUS
Status: DISCONTINUED | OUTPATIENT
Start: 2023-06-01 | End: 2023-06-01

## 2023-06-01 RX ORDER — LIDOCAINE HYDROCHLORIDE 10 MG/ML
INJECTION INFILTRATION; PERINEURAL
Status: DISCONTINUED
Start: 2023-06-01 | End: 2023-06-01 | Stop reason: HOSPADM

## 2023-06-01 RX ADMIN — KETAMINE HYDROCHLORIDE 25 MG: 100 INJECTION, SOLUTION INTRAMUSCULAR; INTRAVENOUS at 07:06

## 2023-06-01 RX ADMIN — SODIUM CHLORIDE, SODIUM GLUCONATE, SODIUM ACETATE, POTASSIUM CHLORIDE AND MAGNESIUM CHLORIDE: 526; 502; 368; 37; 30 INJECTION, SOLUTION INTRAVENOUS at 06:06

## 2023-06-01 RX ADMIN — KETAMINE HYDROCHLORIDE 25 MG: 100 INJECTION, SOLUTION INTRAMUSCULAR; INTRAVENOUS at 06:06

## 2023-06-01 RX ADMIN — CEFAZOLIN 2 G: 2 INJECTION, POWDER, FOR SOLUTION INTRAMUSCULAR; INTRAVENOUS at 06:06

## 2023-06-01 RX ADMIN — SODIUM CHLORIDE, SODIUM GLUCONATE, SODIUM ACETATE, POTASSIUM CHLORIDE AND MAGNESIUM CHLORIDE: 526; 502; 368; 37; 30 INJECTION, SOLUTION INTRAVENOUS at 05:06

## 2023-06-01 RX ADMIN — PROPOFOL 50 MCG/KG/MIN: 10 INJECTION, EMULSION INTRAVENOUS at 06:06

## 2023-06-01 RX ADMIN — MIDAZOLAM 2 MG: 1 INJECTION INTRAMUSCULAR; INTRAVENOUS at 06:06

## 2023-06-01 NOTE — TRANSFER OF CARE
"Anesthesia Transfer of Care Note    Patient: Esperanza Ochoa    Procedure(s) Performed: Procedure(s) (LRB):  RELEASE, CARPAL TUNNEL (Left)    Patient location: PACU    Anesthesia Type: MAC    Transport from OR: Transported from OR on room air with adequate spontaneous ventilation    Post pain: adequate analgesia    Post assessment: no apparent anesthetic complications    Post vital signs: stable    Level of consciousness: awake    Nausea/Vomiting: no nausea/vomiting    Complications: none    Transfer of care protocol was followed      Last vitals:   Visit Vitals  /70   Pulse 69   Temp 36.3 °C (97.4 °F)   Resp 18   Ht 5' 3" (1.6 m)   Wt 85.7 kg (188 lb 15 oz)   SpO2 95%   Breastfeeding No   BMI 33.47 kg/m²     "

## 2023-06-01 NOTE — DISCHARGE SUMMARY
[facility] Orthopaedics - Periop Services  Discharge Note  Short Stay    Procedure(s) (LRB):  RELEASE, CARPAL TUNNEL (Left)      OUTCOME: Patient tolerated treatment/procedure well without complication and is now ready for discharge.    DISPOSITION: Home or Self Care    FINAL DIAGNOSIS:  <principal problem not specified>    FOLLOWUP: In clinic    DISCHARGE INSTRUCTIONS:  No discharge procedures on file.     TIME SPENT ON DISCHARGE: 10 minutes

## 2023-06-01 NOTE — PATIENT INSTRUCTIONS
[facility]  Omer Mcduffie Jr., MD  [address], [address]  [place] [address]  [phone], fax: [phone]    CARPAL TUNNEL RELEASE    DIET:  Following general anesthesia, start with clear liquids to decrease chances of nausea.  Begin with water, coffee, tea, ginger ale, sprite, or apple juice.  If tolerated, advance to Jell-o, soup, crackers, or toast.  Once these are tolerated, advance to a regular diet.    DRESSING:  Keep the splint clean and dry.  Do not remove the splint until the first follow up appointment.    SHOWERING:  You may shower after surgery.  Keep the splint clean and dry during showers.    ACTIVITY:            -  Elevate the surgical site as upright as possible using extra pillows as needed.  Do this for the first few days after surgery to help decrease pain and swelling.            -  Ice should be applied to the surgical site for 30 minutes, 5-6 times per day, for the 1st week to help decrease pain and swelling.      PAIN MEDICATION:       -  Use the Percocet as prescribed for pain after surgery.  Pain medicine can cause nausea and vomiting, especially on an empty stomach.       -  In addition, you can take Ketorolac as prescribed or Iburpofen 200 mg, 4 pills every 8 hours.  Ketorolac or Iburpofen medicine can irritate your stomach or cause heartburn.  If this happens to you, stop taking the medicine.       -  Ice and elevation are more useful than pain medicine for surgical pain.  If you are having too much pain or discomfort, try more ice and higher elevation.       -  Do NOT drink alcohol while taking pain medication.    BLOOD CLOT PREVENTION:  If you are aware that you are at high risk for blood blots, notify your physician.  In general, you should walk s much as possible after surgery to increase blood circulation throughout your body.     DRIVING OR FLYING:  In general, you should NOT drive while wearing a sling  You must NEVER drive while  taking narcotic pain medication  You may ride in a car, take a train, or fly once you feel comfortable    PHYSICAL THERAPY:  You will not start physical therapy until after your first follow up appointment.    WORK OR SCHOOL:  You may return to an office job or school whenver comfortable.  Most patients return ~ 1 week after surgery.  For more active jobs that require extended walking, squatting, or lifting, you can wait until after your follow up appointment.  Any other types of jobs should be discussed with Dr. Mcduffie to determine a date for return to work.    PROBLEMS TO REPORT:       1.  Fever greater than 102 F       2.  Incision that is very red and/or draining pus       3.  Unable to urinate within 8 hours of surgery (a rare effect of being put to sleep for surgery)  Calls should be directed to the clinic:  [phone]    RETURN APPOINTMENT:  To confirm or reschedule your appointment, call [phone]

## 2023-06-01 NOTE — ANESTHESIA PREPROCEDURE EVALUATION
2023  Esperanza Ochoa is a 73 y.o., female who presents with Carpal tunnel syndrome of her left hand/wrist.  Diagnosis: Left carpal tunnel syndrome [G56.02]    The pt. 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     I have reviewed the Nursing Notes. I have reviewed the NPO Status.   I have reviewed the Medications.     Review of Systems  Anesthesia Hx:  No problems with previous Anesthesia    Social:  Non-Smoker    Hematology/Oncology:  Hematology Normal   Oncology Normal     EENT/Dental:EENT/Dental Normal   Cardiovascular:   Exercise tolerance: good Hypertension  Functional Capacity good / => 4 METS    Pulmonary:  Pulmonary Normal    Renal/:  Renal/ Normal     Hepatic/GI:  Hepatic/GI Normal    Musculoskeletal:  Musculoskeletal Normal    Neurological:   Neuromuscular Disease,    Endocrine:  Endocrine Normal    Dermatological:  Skin Normal    Psych:  Psychiatric Normal           Physical Exam  General: Alert, Oriented, Well nourished and Cooperative    Airway:  Mallampati: II   Mouth Opening: Normal  TM Distance: Normal  Tongue: Normal  Neck ROM: Normal ROM    Dental:  Intact    Chest/Lungs:  Clear to auscultation, Normal Respiratory Rate    Heart:  Rate: Normal  Rhythm: Regular Rhythm        Anesthesia Plan  Type of Anesthesia, risks & benefits discussed:    Anesthesia Type: MAC, Gen Natural Airway  Intra-op Monitoring Plan: Standard ASA Monitors  Post Op Pain Control Plan: IV/PO Opioids  PRN  Induction:  IV  ASA Score: 2  Day of Surgery Review of History & Physical: H&P Update referred to the surgeon/provider.    Ready For Surgery From Anesthesia Perspective.     .

## 2023-06-01 NOTE — OP NOTE
Date of Service: 06/01/2023    Preoperative diagnosis: Left Carpal tunnel syndrome    Postoperative diagnosis: Left Carpal tunnel syndrome    Attending surgeon: Omer Mcduffie MD    Assistant: Cherry Angulo, nurse practitioner.  Ms. Angulo was essential in manipulating the hand while perform the release, retraction and nerve protection, and closure.    Procedure: Left Carpal tunnel release     Anesthesia: General plus monitored care    Estimated blood loss: Minimal    Tourniquet time:  15 Minutes    Complications: None    History of present illness: Esperanza is a pleasant 73 y.o.-year-old who has had persisted numbness tingling and shooting pain into the hand.  Subjective and objective signs of carpal tunnel syndrome were observed.  After failing conservative measures, I recommended open carpal tunnel release.  The risks, benefits, and alternatives to therapy were presented.  The patient elects to proceed    Procedure: Esperanza was initially seen in the preoperative unit where a history and physical were reviewed without change.  The operative extremity was marked.  Consents were reviewed.  All questions were answered.  The patient was then taken to the operating room placed supine on the operative table where monitored care was undertaken.  I injected local anesthesia around the incision.  Perioperative antibiotics were administered.  The operative extremity was prepped and draped in sterile fashion.  An attending lead timeout confirmed the operative side; I then began the procedure.    I began my incision at the intersection of Booth's line and the radial side of the ring finger.  I sharply incised through skin and subcutaneous tissue.  I split through the palmar fascia.  I then came down to the transverse carpal ligament.  I first incised this with a 15 blade.  I cleaned the underside of the ligament using a freer elevator.  I then completed the incision using scissors while visualizing the nerve.  I incised it  distally until I encountered yellow fat.  I incised proximally until I was in the forearm fascia.  I inspected the nerve and there was no lesions.  There was no abnormal contents of the carpal tunnel.  I irrigated out the incision.  I closed the incision with nylon sutures.  The patient was placed into a resting hand splint.  The patient was awoken from anesthesia and transferred to the postop unit good condition.

## 2023-06-03 NOTE — ANESTHESIA POSTPROCEDURE EVALUATION
Anesthesia Post Evaluation    Patient: Esperanza Ochoa    Procedure(s) Performed: Procedure(s) (LRB):  RELEASE, CARPAL TUNNEL (Left)    Final Anesthesia Type: general      Patient location during evaluation: PACU  Patient participation: Yes- Able to Participate  Level of consciousness: awake and alert and oriented  Post-procedure vital signs: reviewed and stable  Pain management: adequate  Airway patency: patent  MARIO mitigation strategies: Verification of full reversal of neuromuscular block  PONV status at discharge: No PONV  Anesthetic complications: no      Cardiovascular status: blood pressure returned to baseline and stable  Respiratory status: spontaneous ventilation and unassisted  Hydration status: euvolemic  Follow-up not needed.  Comments: [facility]          Vitals Value Taken Time   /77 06/01/23 0805   Temp 36.5 °C (97.7 °F) 06/01/23 0805   Pulse 60 06/01/23 0805   Resp 20 06/01/23 0805   SpO2 100 % 06/01/23 0805         No case tracking events are documented in the log.      Pain/Marta Score: No data recorded

## 2023-06-12 ENCOUNTER — OFFICE VISIT (OUTPATIENT)
Dept: ORTHOPEDICS | Facility: CLINIC | Age: 73
End: 2023-06-12
Payer: MEDICARE

## 2023-06-12 VITALS — WEIGHT: 188.94 LBS | HEIGHT: 63 IN | BODY MASS INDEX: 33.48 KG/M2

## 2023-06-12 DIAGNOSIS — Z98.890 S/P CARPAL TUNNEL RELEASE: Primary | ICD-10-CM

## 2023-06-12 PROCEDURE — 99024 PR POST-OP FOLLOW-UP VISIT: ICD-10-PCS | Mod: POP,,, | Performed by: NURSE PRACTITIONER

## 2023-06-12 PROCEDURE — 99024 POSTOP FOLLOW-UP VISIT: CPT | Mod: POP,,, | Performed by: NURSE PRACTITIONER

## 2023-06-12 RX ORDER — MELOXICAM 15 MG/1
TABLET ORAL
COMMUNITY
End: 2023-11-07

## 2023-06-12 NOTE — PROGRESS NOTES
Chief Complaint:   Chief Complaint   Patient presents with    Post-op Evaluation     12d sp L carpal tunnel sx 6/1/23-8/30/23. pt states she is doing great and has no issues or complaints today,       History of present illness:  6/1/23: Left Carpal Tunnel Release    She returns today. Her pain is under good control. She still has some numbness and tingling although resolving.     Musculoskeletal:   Left hand incision healing. Without erythema, drainage, or signs of infection. Distally neurovascular intact. Right hand incisions healed.        Assessment: S/P carpal tunnel release        Plan:  Doing well s/p above. Sutures out today, steri strips applied. She can follow up If she has any issues.

## 2023-06-26 NOTE — PROGRESS NOTES
"    [facility] Vascular - Clinic Note  Reno Wilder MD      Patient Name: Esperanza Ochoa                   MRN: [id number]   Visit Date: 6/27/2023       History Present Illness     Reason for Visit: Varicose Veins       Ms. Ochoa presents to the clinic for evaluation of right lower leg discomfort.  She reports a long history of varicosities to both legs but states only developing pain to the right lower leg over a varicose vein in the last year.  No history of cardiac disease.  No history of lower extremity vascular intervention.   No history of deep vein thrombosis.     She can't think of any particular activities that worsen her discomfort.   Has worn compression stockings in the past but not positive they were graduated compression stockings.    Feels that her right leg is larger than left but doesn't appear to have issues with swelling.       REVIEW OF SYSTEMS:  Review of Systems   All other systems reviewed and are negative.  12 point review of systems conducted, negative except as stated in the history of present illness. See HPI for details.        Physical Exam      Visit Vitals  /70 (BP Location: Right arm)   Pulse 62   Ht 5' 3" (1.6 m)   Wt 83.9 kg (185 lb)   BMI 32.77 kg/m²         General: well-nourished, no acute distress, and healthy appearing, ambulating normally, alert, pleasant, conversant, and oriented  Neurologic: cranial nerves are grossly intact, no neurologic deficits  HEENT: grossly normal and no scleral icterus  Neck/Chest: normal  and soft without lymphadenopathy  Respiratory: breathing easily and without respiratory distress  Abdomen: normal, soft, without tenderness, and no palpable masses  Cardiology: regular rate and rhythm    Upper Extremity Arterial Exam:   Right - radial is palpable  Left - radial is palpable    Lower Extremity Arterial Exam:  Right - dorsalis pedis is palpable  Left - dorsalis pedis is palpable    Musculoskeletal:   Lower Extremity: right lower " extremity has trace edema and left lower extremity has trace edema    Skin: has no obvious skin abnormality to lower extremities    Varicose Veins:  Right Lower Extremity - larger network to medial and posterior portion of lower leg  Left Lower Extremity - anterior portion lower leg  Spider veins and reticular veins to bilateral thighs and lower legs    Measurements: 9 inches (ankle), 16.5 inches (calf), 21.5 inches (thigh)          Assessment and Plan     Ms. Ochoa is a 73 y.o. with symptomatic right lower extremity varicose veins that cause her lower leg discomfort that has worsened over the last year.    Recommend right leg venous duplex to evaluate for underlying venous insufficiency.  Measured her legs today and provided recommendations for initiation of graduated compression therapy.   It doesn't appear she has used graduated compression stockings in the past.  Will have her follow up in two months to assess for symptom improvement with compression therapy.       1. Varicose veins of right lower extremity with pain  - Ultrasound Lower Extremity Veins Insuf Right (Cupid Only); Future    2. 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, bilateral      Past Surgical History:   Procedure Laterality Date    CARPAL TUNNEL RELEASE Right 2023    Procedure: RELEASE, CARPAL TUNNEL;  Surgeon: Omer Mcduffie Jr., MD;  Location: [facility] OR;  Service: Orthopedics;  Laterality: Right;    CARPAL TUNNEL RELEASE Left 2023    Procedure: RELEASE, CARPAL TUNNEL;  Surgeon: Omer Mcduffie Jr., MD;  Location: [facility] OR;  Service: Orthopedics;  Laterality: Left;     SECTION      77,81, 83    COLONOSCOPY      TRIGGER FINGER  RELEASE Right 5/4/2023    Procedure: RELEASE, TRIGGER FINGER;  Surgeon: Omer Mcduffie Jr., MD;  Location: [facility] OR;  Service: Orthopedics;  Laterality: Right;     Family History   Problem Relation Age of Onset    No Known Problems Mother     No Known Problems Father      Social History     Socioeconomic History    Marital status:    Tobacco Use    Smoking status: Never    Smokeless tobacco: Never   Substance and Sexual Activity    Alcohol use: Never    Drug use: Never    Sexual activity: Yes     Partners: Male     Current Outpatient Medications   Medication Instructions    carvediloL (COREG) 25 mg, Oral, 2 times daily    cholecalciferol (vitamin D3) (DECARA) 1,000 Units, Oral, Every 7 days    diphenhydrAMINE (BENADRYL) 50 mg, Oral, Nightly    diphenhydrAMINE-acetaminophen (TYLENOL PM)  mg Tab 2 tablets, Oral, Nightly    ketorolac (TORADOL) 10 mg, Oral, 3 times daily    meloxicam (MOBIC) 15 MG tablet meloxicam 15 mg tablet    omega-3s/dha/epa/fish oil/D3 (VITAMIN-D + OMEGA-3 ORAL) 1 tablet, Oral, Daily    simvastatin (ZOCOR) 10 mg, Oral, Nightly    spironolactone (ALDACTONE) 12.5 mg, Oral, Daily    turmeric root extract 500 mg Cap 1 tablet, Oral, Once as needed     Review of patient's allergies indicates:   Allergen Reactions    Benzalkonium Rash     Methiolate is another name-blisters       Patient Care Team:  Familia Busby MD as PCP - General (Family Medicine)        No follow-ups on file. In addition to their scheduled follow up, the patient has also been instructed to follow up on as needed basis.     No future appointments.

## 2023-06-27 ENCOUNTER — OFFICE VISIT (OUTPATIENT)
Dept: VASCULAR SURGERY | Facility: CLINIC | Age: 73
End: 2023-06-27
Payer: MEDICARE

## 2023-06-27 VITALS
WEIGHT: 185 LBS | DIASTOLIC BLOOD PRESSURE: 70 MMHG | SYSTOLIC BLOOD PRESSURE: 132 MMHG | BODY MASS INDEX: 32.78 KG/M2 | HEART RATE: 62 BPM | HEIGHT: 63 IN

## 2023-06-27 DIAGNOSIS — I83.811 VARICOSE VEINS OF RIGHT LOWER EXTREMITY WITH PAIN: Primary | ICD-10-CM

## 2023-06-27 DIAGNOSIS — I83.93 VARICOSE VEINS OF BOTH LOWER EXTREMITIES WITHOUT ULCER OR INFLAMMATION: ICD-10-CM

## 2023-06-27 PROCEDURE — 99203 PR OFFICE/OUTPT VISIT, NEW, LEVL III, 30-44 MIN: ICD-10-PCS | Mod: ,,, | Performed by: SPECIALIST

## 2023-06-27 PROCEDURE — 99203 OFFICE O/P NEW LOW 30 MIN: CPT | Mod: ,,, | Performed by: SPECIALIST

## 2023-07-06 ENCOUNTER — TELEPHONE (OUTPATIENT)
Dept: VASCULAR SURGERY | Facility: CLINIC | Age: 73
End: 2023-07-06
Payer: MEDICARE

## 2023-07-06 NOTE — TELEPHONE ENCOUNTER
Venous ultrasound results reviewed by Dr. Wilder. There is evidence of venous insufficiency on the study. He advises to continue current recommendation of graduated compression therapy trial. Will follow up in 2-3 months to assess symptoms and discuss the options and need for further intervention. Provided MsMaribel Don with these recommendations. She states understanding.

## 2023-08-28 NOTE — PROGRESS NOTES
"    [facility] Vascular - Clinic Note  Reno Wilder MD      Patient Name: Esperanza Ochoa                   : 1950      MRN: [id number]   Visit Date: 2023       History Present Illness     Reason for Visit: Leg swelling    Ms. Ochoa presents to the clinic for 2 month follow up for symptomatic varicose veins of her right leg. Venous duplex was done in . She has been using knee high compression stockings consistently over the last 2 months and reports some improvement but some symptoms remain.   She describes her right leg as feeling some fatigue and heaviness at the end of the day.   She does not want to continue compression stocking use indefinitely and remains in search of definitive resolution.    She denies history of deep vein thrombosis or previous venous procedures.       REVIEW OF SYSTEMS:  ROS  12 point review of systems conducted, negative except as stated in the history of present illness. See HPI for details.        Physical Exam      Visit Vitals  /64 (BP Location: Right arm)   Pulse 71   Ht 5' 3" (1.6 m)   Wt 83.9 kg (185 lb)   BMI 32.77 kg/m²         General: well-nourished, no acute distress, and healthy appearing, ambulating normally, alert, pleasant, conversant, and oriented  Neurologic: cranial nerves are grossly intact, no neurologic deficits  HEENT: grossly normal and no scleral icterus  Neck/Chest: normal  and soft without lymphadenopathy  Respiratory: breathing easily and without respiratory distress  Abdomen: normal and soft  Cardiology: regular rate and rhythm    Upper Extremity Arterial Exam:   Right - radial is palpable  Left - radial is palpable    Musculoskeletal:   Lower Extremity: right lower extremity has trace edema and left lower extremity has trace edema    Varicose Veins:  Right Lower Extremity - larger network to medial and posterior portion of lower leg  Left Lower Extremity - anterior portion lower leg  Spider veins and reticular veins to bilateral " thighs and lower legs          Assessment and Plan     Ms. Ochoa is a 73 y.o. woman with symptomatic right lower extremity varicose veins with worsening discomfort.  Venous duplex obtained 6/29/23 demonstrates reflux and dilation of the right greater saphenous vein with associated varicosities. We discussed continued compression therapy versus procedural intervention. I recommend RIGHT GREATER SAPHENOUS VEIN ENDOVENOUS LASER ABLATION with RIGHT LOWER LEG STAB PHLEBECTOMIES (WITH POSSIBLE  LIGATION). We discussed the risks and benefits of the procedure including DVT, thrombophlebitis, bleeding, nerve injury, skin injury/burn, and/or need for further procedures. She wishes to proceed.          1. Varicose veins of right lower extremity with complications  - X-Ray Chest PA And Lateral; Future  - EKG 12-lead; Future  - CBC Auto Differential; Future  - Basic Metabolic Panel; Future  - HYDROcodone-acetaminophen (NORCO) 7.5-325 mg per tablet; Take 1 tablet by mouth every 6 (six) hours as needed for Pain.  Dispense: 28 tablet; Refill: 0  - CV Ultrasound doppler venous DVT leg right; Future  - COMPRESSION STOCKINGS  - Diet NPO; Standing  - 0.9%  NaCl infusion  - IP VTE LOW RISK PATIENT; Standing  - Case Request Operating Room: STAB PHLEBECTOMY, VARICOSE VEIN, Ablation, Vein, Peripheral, Percutaneous, Endovenous, Using Laser  - Place in Outpatient; Standing  - Place sequential compression device; Standing  - ceFAZolin (ANCEF) 2 g in dextrose 5 % (D5W) 50 mL IVPB  - Clip and Prep Other (please specifiy) (right leg); Standing    2. Right leg pain  - Diet NPO; Standing  - 0.9%  NaCl infusion  - IP VTE LOW RISK PATIENT; Standing  - Case Request Operating Room: STAB PHLEBECTOMY, VARICOSE VEIN, Ablation, Vein, Peripheral, Percutaneous, Endovenous, Using Laser  - Place in Outpatient; Standing  - Place sequential compression device; Standing  - ceFAZolin (ANCEF) 2 g in dextrose 5 % (D5W) 50 mL IVPB  - Clip and Prep Other  (please specifiy) (right leg); Standing           Imaging Obtained/Reviewed   Study: right lower extremity venous duplex  Date:   23        Medical History     Past Medical History:   Diagnosis Date    Carpal tunnel syndrome     High cholesterol     Hypertension     Insomnia     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, bilateral      Past Surgical History:   Procedure Laterality Date    CARPAL TUNNEL RELEASE Right 2023    Procedure: RELEASE, CARPAL TUNNEL;  Surgeon: Omer Mcduffie Jr., MD;  Location: [facility] OR;  Service: Orthopedics;  Laterality: Right;    CARPAL TUNNEL RELEASE Left 2023    Procedure: RELEASE, CARPAL TUNNEL;  Surgeon: Omer Mcduffie Jr., MD;  Location: [facility] OR;  Service: Orthopedics;  Laterality: Left;     SECTION  1973    77,81, 83    COLONOSCOPY      TRIGGER FINGER RELEASE Right 2023    Procedure: RELEASE, TRIGGER FINGER;  Surgeon: Omer Mcduffie Jr., MD;  Location: [facility] OR;  Service: Orthopedics;  Laterality: Right;     Family History   Problem Relation Age of Onset    No Known Problems Mother     No Known Problems Father      Social History     Socioeconomic History    Marital status:    Tobacco Use    Smoking status: Never    Smokeless tobacco: Never   Substance and Sexual Activity    Alcohol use: Never    Drug use: Never    Sexual activity: Yes     Partners: Male     Current Outpatient Medications   Medication Instructions    carvediloL (COREG) 25 mg, Oral, 2 times daily    cholecalciferol (vitamin D3) (DECARA) 1,000 Units, Oral, Every 7 days    diphenhydrAMINE (BENADRYL) 50 mg, Oral, Nightly    diphenhydrAMINE-acetaminophen (TYLENOL PM)  mg Tab 2 tablets, Oral, Nightly    HYDROcodone-acetaminophen (NORCO) 7.5-325 mg per tablet 1 tablet, Oral, Every 6 hours PRN    meloxicam (MOBIC) 15 MG tablet meloxicam 15 mg tablet    omega-3s/dha/epa/fish oil/D3 (VITAMIN-D + OMEGA-3 ORAL) 1 tablet, Oral, Daily    simvastatin (ZOCOR) 10 mg, Oral, Nightly    spironolactone (ALDACTONE) 12.5 mg, Oral,  Daily    turmeric root extract 500 mg Cap 1 tablet, Oral, Once as needed     Review of patient's allergies indicates:   Allergen Reactions    Benzalkonium Rash     Methiolate is another name-blisters       Patient Care Team:  Familia Busby MD as PCP - General (Family Medicine)        No follow-ups on file. In addition to their scheduled follow up, the patient has also been instructed to follow up on as needed basis.     Future Appointments   Date Time Provider Department Center   9/28/2023  3:00 PM [facility] VASCULAR SURGERY  [address] [facility]

## 2023-08-31 ENCOUNTER — OFFICE VISIT (OUTPATIENT)
Dept: VASCULAR SURGERY | Facility: CLINIC | Age: 73
End: 2023-08-31
Payer: MEDICARE

## 2023-08-31 VITALS
SYSTOLIC BLOOD PRESSURE: 101 MMHG | HEIGHT: 63 IN | HEART RATE: 71 BPM | DIASTOLIC BLOOD PRESSURE: 64 MMHG | BODY MASS INDEX: 32.78 KG/M2 | WEIGHT: 185 LBS

## 2023-08-31 DIAGNOSIS — I83.891 VARICOSE VEINS OF RIGHT LOWER EXTREMITY WITH COMPLICATIONS: Primary | ICD-10-CM

## 2023-08-31 DIAGNOSIS — M79.604 RIGHT LEG PAIN: ICD-10-CM

## 2023-08-31 PROCEDURE — 99213 OFFICE O/P EST LOW 20 MIN: CPT | Mod: ,,, | Performed by: SPECIALIST

## 2023-08-31 PROCEDURE — 99213 PR OFFICE/OUTPT VISIT, EST, LEVL III, 20-29 MIN: ICD-10-PCS | Mod: ,,, | Performed by: SPECIALIST

## 2023-08-31 RX ORDER — SODIUM CHLORIDE 9 MG/ML
INJECTION, SOLUTION INTRAVENOUS CONTINUOUS
Status: CANCELLED | OUTPATIENT
Start: 2023-09-25

## 2023-08-31 RX ORDER — HYDROCODONE BITARTRATE AND ACETAMINOPHEN 7.5; 325 MG/1; MG/1
1 TABLET ORAL EVERY 6 HOURS PRN
Qty: 28 TABLET | Refills: 0 | Status: SHIPPED | OUTPATIENT
Start: 2023-08-31 | End: 2023-11-07

## 2023-09-06 ENCOUNTER — HOSPITAL ENCOUNTER (OUTPATIENT)
Dept: RADIOLOGY | Facility: HOSPITAL | Age: 73
Discharge: HOME | End: 2023-09-06
Attending: SPECIALIST
Payer: MEDICARE

## 2023-09-06 DIAGNOSIS — I83.891 VARICOSE VEINS OF RIGHT LOWER EXTREMITY WITH COMPLICATIONS: ICD-10-CM

## 2023-09-06 PROCEDURE — 71046 X-RAY EXAM CHEST 2 VIEWS: CPT | Mod: TC

## 2023-09-26 ENCOUNTER — TELEPHONE (OUTPATIENT)
Dept: VASCULAR SURGERY | Facility: CLINIC | Age: 73
End: 2023-09-26
Payer: MEDICARE

## 2023-09-26 NOTE — TELEPHONE ENCOUNTER
9/22/23 - Ms. Ochoa was contacted to reschedule her procedure due to lack of prior authorization from her insurance. She states she continues to use compression daily to attempt to control symptoms but remains with fatigue and heaviness as previously described. Tentatively rescheduled to 10/9/23 awaiting review by her insurance.

## 2023-10-19 ENCOUNTER — DOCUMENTATION ONLY (OUTPATIENT)
Dept: VASCULAR SURGERY | Facility: CLINIC | Age: 73
End: 2023-10-19

## 2023-10-19 NOTE — PROGRESS NOTES
Mrs. Ochoa is a 73-year-old woman with symptomatic right lower extremity varicose veins and discomfort.  We did previously evaluate her with ultrasound in did notice some focal reflux to her saphenous vein with some dilation.  We discussed options with her and did plan for right great saphenous vein laser ablation and stab phlebectomy.  Unfortunately her procedure has been delayed significantly by Medicare authorization issues.    The need for Medicare authorization is new recently and appears to be an attempt to create obstructions that decrease utilization of these procedures.  Nevertheless it does not change our recommendations for this procedure.  She understands the risks and benefits in the intervention will likely result in clinical benefit.    As a part of this process, the Medicare contractor has most recently said that they will not allow the procedure as they are unable to confirm that she does not have aneurysmal changes or significant tortuosity.  I have reviewed her ultrasound images and I see no aneurysmal changes to the saphenous vein and no significant tortuosity.  Hopefully the statement will satisfy the requirements.  We generally report these findings as pertinent positives given their uncommon nature.  The lack of reporting of these in our reporting system signifies that they were not identified.  Nevertheless I affirm I have reviewed the images and see no evidence of significant saphenous vein tortuosity or aneurysmal changes.

## 2023-10-23 ENCOUNTER — HOSPITAL ENCOUNTER (OUTPATIENT)
Dept: RADIOLOGY | Facility: CLINIC | Age: 73
Discharge: HOME | End: 2023-10-23
Attending: ORTHOPAEDIC SURGERY
Payer: MEDICARE

## 2023-10-23 ENCOUNTER — OFFICE VISIT (OUTPATIENT)
Dept: ORTHOPEDICS | Facility: CLINIC | Age: 73
End: 2023-10-23
Payer: MEDICARE

## 2023-10-23 VITALS — HEIGHT: 63 IN | WEIGHT: 185 LBS | BODY MASS INDEX: 32.78 KG/M2

## 2023-10-23 DIAGNOSIS — M25.511 RIGHT SHOULDER PAIN, UNSPECIFIED CHRONICITY: ICD-10-CM

## 2023-10-23 DIAGNOSIS — M75.101 TEAR OF RIGHT ROTATOR CUFF, UNSPECIFIED TEAR EXTENT, UNSPECIFIED WHETHER TRAUMATIC: Primary | ICD-10-CM

## 2023-10-23 PROCEDURE — 20610 DRAIN/INJ JOINT/BURSA W/O US: CPT | Mod: RT,,, | Performed by: NURSE PRACTITIONER

## 2023-10-23 PROCEDURE — 73030 XR SHOULDER COMPLETE 2 OR MORE VIEWS RIGHT: ICD-10-PCS | Mod: RT,,, | Performed by: ORTHOPAEDIC SURGERY

## 2023-10-23 PROCEDURE — 73030 X-RAY EXAM OF SHOULDER: CPT | Mod: RT,,, | Performed by: ORTHOPAEDIC SURGERY

## 2023-10-23 PROCEDURE — 20610 LARGE JOINT ASPIRATION/INJECTION: R SUBACROMIAL BURSA: ICD-10-PCS | Mod: RT,,, | Performed by: NURSE PRACTITIONER

## 2023-10-23 PROCEDURE — 99213 PR OFFICE/OUTPT VISIT, EST, LEVL III, 20-29 MIN: ICD-10-PCS | Mod: 25,,, | Performed by: NURSE PRACTITIONER

## 2023-10-23 PROCEDURE — 99213 OFFICE O/P EST LOW 20 MIN: CPT | Mod: 25,,, | Performed by: NURSE PRACTITIONER

## 2023-10-23 RX ORDER — BETAMETHASONE SODIUM PHOSPHATE AND BETAMETHASONE ACETATE 3; 3 MG/ML; MG/ML
6 INJECTION, SUSPENSION INTRA-ARTICULAR; INTRALESIONAL; INTRAMUSCULAR; SOFT TISSUE
Status: DISCONTINUED | OUTPATIENT
Start: 2023-10-23 | End: 2023-10-23 | Stop reason: HOSPADM

## 2023-10-23 RX ORDER — LIDOCAINE HYDROCHLORIDE 10 MG/ML
3 INJECTION INFILTRATION; PERINEURAL
Status: DISCONTINUED | OUTPATIENT
Start: 2023-10-23 | End: 2023-10-23 | Stop reason: HOSPADM

## 2023-10-23 RX ADMIN — BETAMETHASONE SODIUM PHOSPHATE AND BETAMETHASONE ACETATE 6 MG: 3; 3 INJECTION, SUSPENSION INTRA-ARTICULAR; INTRALESIONAL; INTRAMUSCULAR; SOFT TISSUE at 09:10

## 2023-10-23 RX ADMIN — LIDOCAINE HYDROCHLORIDE 3 ML: 10 INJECTION INFILTRATION; PERINEURAL at 09:10

## 2023-10-23 NOTE — PROCEDURES
Large Joint Aspiration/Injection: R subacromial bursa    Date/Time: 10/23/2023 9:45 AM    Performed by: Cherry Angulo FNP  Authorized by: Cherry Angulo FNP    Consent Done?:  Yes (Verbal)  Indications:  Pain  Site marked: the procedure site was marked    Timeout: prior to procedure the correct patient, procedure, and site was verified    Prep: patient was prepped and draped in usual sterile fashion      Local anesthesia used?: Yes    Local anesthetic:  Topical anesthetic    Details:  Needle Size:  22 G  Approach:  Posterior  Location:  Shoulder  Site:  R subacromial bursa  Medications:  3 mL LIDOcaine HCL 10 mg/ml (1%) 10 mg/mL (1 %); 6 mg betamethasone acetate-betamethasone sodium phosphate 6 mg/mL  Patient tolerance:  Patient tolerated the procedure well with no immediate complications
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
## 2023-11-02 DIAGNOSIS — I83.811 VARICOSE VEINS OF RIGHT LOWER EXTREMITY WITH PAIN: Primary | ICD-10-CM

## 2023-11-02 RX ORDER — MUPIROCIN 20 MG/G
OINTMENT TOPICAL
Status: CANCELLED | OUTPATIENT
Start: 2023-11-02

## 2023-11-02 RX ORDER — SODIUM CHLORIDE 9 MG/ML
INJECTION, SOLUTION INTRAVENOUS CONTINUOUS
Status: CANCELLED | OUTPATIENT
Start: 2023-11-02

## 2023-11-02 NOTE — H&P (VIEW-ONLY)
"    [facility] Vascular - Clinic Note  Reno Wilder MD      Patient Name: Esperanza Ochoa                   : 1950      MRN: [id number]   Visit Date: 2023       History Present Illness     Reason for Visit: History and Physical Update and Varicose Veins    Ms. Ochoa presents to the clinic for history and physical update in preparation for right leg endovenous laser ablation and stab phlebectomy scheduled 23. She was originally postponed secondary to insurance requirements. She has been utilizing compression therapy since  with some improvement however she remains with symptoms which are worse in the right leg. She experiences leg fatigue and heaviness which is worse at the end of day.       REVIEW OF SYSTEMS:  ROS  12 point review of systems conducted, negative except as stated in the history of present illness. See HPI for details.        Physical Exam      Vitals:    23 0804 23 0805   BP: 116/78 133/80   BP Location: Right forearm Left forearm   Pulse: 76 66   Weight:  83.9 kg (185 lb)   Height:  5' 3" (1.6 m)          General: well-nourished, no acute distress, and healthy appearing, ambulating normally, alert, pleasant, conversant, and oriented  Neurologic: cranial nerves are grossly intact, no neurologic deficits, no motor deficits, and no sensory deficits  HEENT: grossly normal and no scleral icterus  Neck/Chest: normal  and soft without lymphadenopathy  Respiratory: breathing easily and without respiratory distress  Abdomen: normal and soft  Cardiology: regular rate and rhythm    Upper Extremity Arterial Exam:   Right - radial is palpable  Left - radial is palpable      Musculoskeletal:   Lower Extremity: right lower extremity has trace edema and left lower extremity has trace edema      Varicose Veins:  Right Lower Extremity - larger network to medial and posterior portion of lower leg  Left Lower Extremity - anterior portion lower leg  Spider veins and reticular " veins to bilateral thighs and lower legs    I evaluated the right lower leg with SonoSite ultrasound and did not see a  to the deep system.  There is a varicosity which emerges from the saphenous vein fascia to a more superficial level and this may be what was referred to as a  by the technician in the recent ultrasound.             Assessment and Plan     Ms. Ochoa is a 73 y.o.  with symptomatic right lower extremity varicose veins with worsening discomfort.  Venous duplex obtained 23 demonstrates some focal reflux and dilation of the right great saphenous vein and short saphenous vein with associated varicosities (without aneurysmal dilation or tortuosity to the saphenous vein). We discussed continued compression therapy versus procedural intervention. I recommend RIGHT GREAT SAPHENOUS AND SHORT SAPHENOUS VEIN ENDOVENOUS LASER ABLATION with RIGHT LOWER LEG STAB PHLEBECTOMIES.  We discussed the risks and benefits of the procedure including DVT, thrombophlebitis, bleeding, nerve injury, skin injury/burn, and/or need for further procedures. She wishes to proceed.        1. Varicose veins of right lower extremity with pain    2. 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, bilateral      Past Surgical History:   Procedure Laterality Date    CARPAL TUNNEL RELEASE Right 2023    Procedure: RELEASE, CARPAL TUNNEL;  Surgeon: Omer Mcduffie Jr., MD;  Location: [facility] OR;  Service: Orthopedics;  Laterality: Right;    CARPAL TUNNEL RELEASE Left 2023    Procedure: RELEASE, CARPAL TUNNEL;  Surgeon: Omer Mcduffie Jr., MD;  Location: [facility] OR;  Service: Orthopedics;  Laterality: Left;     SECTION      77,81,83    COLONOSCOPY      TRIGGER  FINGER RELEASE Right 05/04/2023    Procedure: RELEASE, TRIGGER FINGER;  Surgeon: Omer Mcduffie Jr., MD;  Location: [facility] OR;  Service: Orthopedics;  Laterality: Right;    TUBAL LIGATION       Family History   Problem Relation Age of Onset    No Known Problems Mother     No Known Problems Father      Social History     Socioeconomic History    Marital status:    Tobacco Use    Smoking status: Never    Smokeless tobacco: Never   Substance and Sexual Activity    Alcohol use: Never    Drug use: Never    Sexual activity: Yes     Partners: Male     Current Outpatient Medications   Medication Instructions    carvediloL (COREG) 25 mg, Oral, 2 times daily    cholecalciferol (vitamin D3) (DECARA) 1,000 Units, Oral, Every 7 days    diphenhydrAMINE (BENADRYL) 50 mg, Oral, Nightly    diphenhydrAMINE-acetaminophen (TYLENOL PM)  mg Tab 2 tablets, Oral, Nightly    omega-3s/dha/epa/fish oil/D3 (VITAMIN-D + OMEGA-3 ORAL) 1 tablet, Oral, Daily, Will stop om 9/17/23     simvastatin (ZOCOR) 10 mg, Oral, Nightly    spironolactone (ALDACTONE) 12.5 mg, Oral, Daily    turmeric root extract 500 mg Cap 1 tablet, Oral, Once as needed, Will stop on 9/17//23      Review of patient's allergies indicates:   Allergen Reactions    Benzalkonium Rash     Methiolate is another name-blisters       Patient Care Team:  Familia Busby MD as PCP - General (Family Medicine)        No follow-ups on file. In addition to their scheduled follow up, the patient has also been instructed to follow up on as needed basis.     No future appointments.

## 2023-11-02 NOTE — PROGRESS NOTES
"    [facility] Vascular - Clinic Note  Reno Wilder MD      Patient Name: Esperanza Ochoa                   : 1950      MRN: [id number]   Visit Date: 2023       History Present Illness     Reason for Visit: History and Physical Update and Varicose Veins    Ms. Ochoa presents to the clinic for history and physical update in preparation for right leg endovenous laser ablation and stab phlebectomy scheduled 23. She was originally postponed secondary to insurance requirements. She has been utilizing compression therapy since  with some improvement however she remains with symptoms which are worse in the right leg. She experiences leg fatigue and heaviness which is worse at the end of day.       REVIEW OF SYSTEMS:  ROS  12 point review of systems conducted, negative except as stated in the history of present illness. See HPI for details.        Physical Exam      Vitals:    23 0804 23 0805   BP: 116/78 133/80   BP Location: Right forearm Left forearm   Pulse: 76 66   Weight:  83.9 kg (185 lb)   Height:  5' 3" (1.6 m)          General: well-nourished, no acute distress, and healthy appearing, ambulating normally, alert, pleasant, conversant, and oriented  Neurologic: cranial nerves are grossly intact, no neurologic deficits, no motor deficits, and no sensory deficits  HEENT: grossly normal and no scleral icterus  Neck/Chest: normal  and soft without lymphadenopathy  Respiratory: breathing easily and without respiratory distress  Abdomen: normal and soft  Cardiology: regular rate and rhythm    Upper Extremity Arterial Exam:   Right - radial is palpable  Left - radial is palpable      Musculoskeletal:   Lower Extremity: right lower extremity has trace edema and left lower extremity has trace edema      Varicose Veins:  Right Lower Extremity - larger network to medial and posterior portion of lower leg  Left Lower Extremity - anterior portion lower leg  Spider veins and reticular " veins to bilateral thighs and lower legs    I evaluated the right lower leg with SonoSite ultrasound and did not see a  to the deep system.  There is a varicosity which emerges from the saphenous vein fascia to a more superficial level and this may be what was referred to as a  by the technician in the recent ultrasound.             Assessment and Plan     Ms. Ochoa is a 73 y.o.  with symptomatic right lower extremity varicose veins with worsening discomfort.  Venous duplex obtained 23 demonstrates some focal reflux and dilation of the right great saphenous vein and short saphenous vein with associated varicosities (without aneurysmal dilation or tortuosity to the saphenous vein). We discussed continued compression therapy versus procedural intervention. I recommend RIGHT GREAT SAPHENOUS AND SHORT SAPHENOUS VEIN ENDOVENOUS LASER ABLATION with RIGHT LOWER LEG STAB PHLEBECTOMIES.  We discussed the risks and benefits of the procedure including DVT, thrombophlebitis, bleeding, nerve injury, skin injury/burn, and/or need for further procedures. She wishes to proceed.        1. Varicose veins of right lower extremity with pain    2. 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, bilateral      Past Surgical History:   Procedure Laterality Date    CARPAL TUNNEL RELEASE Right 2023    Procedure: RELEASE, CARPAL TUNNEL;  Surgeon: Omer Mcduffie Jr., MD;  Location: [facility] OR;  Service: Orthopedics;  Laterality: Right;    CARPAL TUNNEL RELEASE Left 2023    Procedure: RELEASE, CARPAL TUNNEL;  Surgeon: Omer Mcduffie Jr., MD;  Location: [facility] OR;  Service: Orthopedics;  Laterality: Left;     SECTION      77,81,83    COLONOSCOPY      TRIGGER  FINGER RELEASE Right 05/04/2023    Procedure: RELEASE, TRIGGER FINGER;  Surgeon: Omer Mcduffie Jr., MD;  Location: [facility] OR;  Service: Orthopedics;  Laterality: Right;    TUBAL LIGATION       Family History   Problem Relation Age of Onset    No Known Problems Mother     No Known Problems Father      Social History     Socioeconomic History    Marital status:    Tobacco Use    Smoking status: Never    Smokeless tobacco: Never   Substance and Sexual Activity    Alcohol use: Never    Drug use: Never    Sexual activity: Yes     Partners: Male     Current Outpatient Medications   Medication Instructions    carvediloL (COREG) 25 mg, Oral, 2 times daily    cholecalciferol (vitamin D3) (DECARA) 1,000 Units, Oral, Every 7 days    diphenhydrAMINE (BENADRYL) 50 mg, Oral, Nightly    diphenhydrAMINE-acetaminophen (TYLENOL PM)  mg Tab 2 tablets, Oral, Nightly    omega-3s/dha/epa/fish oil/D3 (VITAMIN-D + OMEGA-3 ORAL) 1 tablet, Oral, Daily, Will stop om 9/17/23     simvastatin (ZOCOR) 10 mg, Oral, Nightly    spironolactone (ALDACTONE) 12.5 mg, Oral, Daily    turmeric root extract 500 mg Cap 1 tablet, Oral, Once as needed, Will stop on 9/17//23      Review of patient's allergies indicates:   Allergen Reactions    Benzalkonium Rash     Methiolate is another name-blisters       Patient Care Team:  Familia Busby MD as PCP - General (Family Medicine)        No follow-ups on file. In addition to their scheduled follow up, the patient has also been instructed to follow up on as needed basis.     No future appointments.

## 2023-11-07 ENCOUNTER — OFFICE VISIT (OUTPATIENT)
Dept: VASCULAR SURGERY | Facility: CLINIC | Age: 73
End: 2023-11-07
Payer: MEDICARE

## 2023-11-07 VITALS
HEART RATE: 66 BPM | WEIGHT: 185 LBS | DIASTOLIC BLOOD PRESSURE: 80 MMHG | SYSTOLIC BLOOD PRESSURE: 133 MMHG | HEIGHT: 63 IN | BODY MASS INDEX: 32.78 KG/M2

## 2023-11-07 DIAGNOSIS — I87.2 VENOUS INSUFFICIENCY OF RIGHT LEG: ICD-10-CM

## 2023-11-07 DIAGNOSIS — I83.811 VARICOSE VEINS OF RIGHT LOWER EXTREMITY WITH PAIN: Primary | ICD-10-CM

## 2023-11-07 PROCEDURE — 3075F SYST BP GE 130 - 139MM HG: CPT | Mod: CPTII,,, | Performed by: SPECIALIST

## 2023-11-07 PROCEDURE — 3288F FALL RISK ASSESSMENT DOCD: CPT | Mod: CPTII,,, | Performed by: SPECIALIST

## 2023-11-07 PROCEDURE — 3008F BODY MASS INDEX DOCD: CPT | Mod: CPTII,,, | Performed by: SPECIALIST

## 2023-11-07 PROCEDURE — 1159F PR MEDICATION LIST DOCUMENTED IN MEDICAL RECORD: ICD-10-PCS | Mod: CPTII,,, | Performed by: SPECIALIST

## 2023-11-07 PROCEDURE — 99213 OFFICE O/P EST LOW 20 MIN: CPT | Mod: ,,, | Performed by: SPECIALIST

## 2023-11-07 PROCEDURE — 1101F PR PT FALLS ASSESS DOC 0-1 FALLS W/OUT INJ PAST YR: ICD-10-PCS | Mod: CPTII,,, | Performed by: SPECIALIST

## 2023-11-07 PROCEDURE — 3079F PR MOST RECENT DIASTOLIC BLOOD PRESSURE 80-89 MM HG: ICD-10-PCS | Mod: CPTII,,, | Performed by: SPECIALIST

## 2023-11-07 PROCEDURE — 3288F PR FALLS RISK ASSESSMENT DOCUMENTED: ICD-10-PCS | Mod: CPTII,,, | Performed by: SPECIALIST

## 2023-11-07 PROCEDURE — 3075F PR MOST RECENT SYSTOLIC BLOOD PRESS GE 130-139MM HG: ICD-10-PCS | Mod: CPTII,,, | Performed by: SPECIALIST

## 2023-11-07 PROCEDURE — 1101F PT FALLS ASSESS-DOCD LE1/YR: CPT | Mod: CPTII,,, | Performed by: SPECIALIST

## 2023-11-07 PROCEDURE — 3079F DIAST BP 80-89 MM HG: CPT | Mod: CPTII,,, | Performed by: SPECIALIST

## 2023-11-07 PROCEDURE — 1160F RVW MEDS BY RX/DR IN RCRD: CPT | Mod: CPTII,,, | Performed by: SPECIALIST

## 2023-11-07 PROCEDURE — 1160F PR REVIEW ALL MEDS BY PRESCRIBER/CLIN PHARMACIST DOCUMENTED: ICD-10-PCS | Mod: CPTII,,, | Performed by: SPECIALIST

## 2023-11-07 PROCEDURE — 99213 PR OFFICE/OUTPT VISIT, EST, LEVL III, 20-29 MIN: ICD-10-PCS | Mod: ,,, | Performed by: SPECIALIST

## 2023-11-07 PROCEDURE — 3008F PR BODY MASS INDEX (BMI) DOCUMENTED: ICD-10-PCS | Mod: CPTII,,, | Performed by: SPECIALIST

## 2023-11-07 PROCEDURE — 1159F MED LIST DOCD IN RCRD: CPT | Mod: CPTII,,, | Performed by: SPECIALIST

## 2023-11-07 RX ORDER — HYDROCODONE BITARTRATE AND ACETAMINOPHEN 7.5; 325 MG/1; MG/1
1 TABLET ORAL EVERY 6 HOURS PRN
Qty: 20 TABLET | Refills: 0 | Status: SHIPPED | OUTPATIENT
Start: 2023-11-07 | End: 2024-01-02

## 2023-11-13 ENCOUNTER — HOSPITAL ENCOUNTER (OUTPATIENT)
Facility: HOSPITAL | Age: 73
Discharge: HOME | End: 2023-11-13
Attending: SPECIALIST | Admitting: SPECIALIST
Payer: MEDICARE

## 2023-11-13 ENCOUNTER — ANESTHESIA (OUTPATIENT)
Dept: SURGERY | Facility: HOSPITAL | Age: 73
End: 2023-11-13
Payer: MEDICARE

## 2023-11-13 ENCOUNTER — ANESTHESIA EVENT (OUTPATIENT)
Dept: SURGERY | Facility: HOSPITAL | Age: 73
End: 2023-11-13
Payer: MEDICARE

## 2023-11-13 DIAGNOSIS — I83.811 VARICOSE VEINS OF RIGHT LOWER EXTREMITY WITH PAIN: ICD-10-CM

## 2023-11-13 PROCEDURE — 63600175 PHARM REV CODE 636 W HCPCS: Performed by: NURSE ANESTHETIST, CERTIFIED REGISTERED

## 2023-11-13 PROCEDURE — D9220A PRA ANESTHESIA: ICD-10-PCS | Mod: CRNA,,, | Performed by: NURSE ANESTHETIST, CERTIFIED REGISTERED

## 2023-11-13 PROCEDURE — 25000003 PHARM REV CODE 250: Performed by: NURSE ANESTHETIST, CERTIFIED REGISTERED

## 2023-11-13 PROCEDURE — 63600175 PHARM REV CODE 636 W HCPCS: Performed by: STUDENT IN AN ORGANIZED HEALTH CARE EDUCATION/TRAINING PROGRAM

## 2023-11-13 PROCEDURE — 37000008 HC ANESTHESIA 1ST 15 MINUTES: Performed by: SPECIALIST

## 2023-11-13 PROCEDURE — 71000033 HC RECOVERY, INTIAL HOUR: Performed by: SPECIALIST

## 2023-11-13 PROCEDURE — 71000015 HC POSTOP RECOV 1ST HR: Performed by: SPECIALIST

## 2023-11-13 PROCEDURE — 36479 PR ENDOVENOUS LASER VEIN ADDON: ICD-10-PCS | Mod: RT,,, | Performed by: SPECIALIST

## 2023-11-13 PROCEDURE — D9220A PRA ANESTHESIA: Mod: CRNA,,, | Performed by: NURSE ANESTHETIST, CERTIFIED REGISTERED

## 2023-11-13 PROCEDURE — 36000707: Performed by: SPECIALIST

## 2023-11-13 PROCEDURE — 63600175 PHARM REV CODE 636 W HCPCS

## 2023-11-13 PROCEDURE — 71000016 HC POSTOP RECOV ADDL HR: Performed by: SPECIALIST

## 2023-11-13 PROCEDURE — 36479 ENDOVENOUS LASER VEIN ADDON: CPT | Mod: RT,,, | Performed by: SPECIALIST

## 2023-11-13 PROCEDURE — 36478 ENDOVENOUS LASER 1ST VEIN: CPT | Mod: 51,RT,, | Performed by: SPECIALIST

## 2023-11-13 PROCEDURE — D9220A PRA ANESTHESIA: Mod: ANES,,, | Performed by: STUDENT IN AN ORGANIZED HEALTH CARE EDUCATION/TRAINING PROGRAM

## 2023-11-13 PROCEDURE — D9220A PRA ANESTHESIA: ICD-10-PCS | Mod: ANES,,, | Performed by: STUDENT IN AN ORGANIZED HEALTH CARE EDUCATION/TRAINING PROGRAM

## 2023-11-13 PROCEDURE — 27201423 OPTIME MED/SURG SUP & DEVICES STERILE SUPPLY: Performed by: SPECIALIST

## 2023-11-13 PROCEDURE — C1894 INTRO/SHEATH, NON-LASER: HCPCS | Performed by: SPECIALIST

## 2023-11-13 PROCEDURE — 88305 TISSUE EXAM BY PATHOLOGIST: CPT | Performed by: SPECIALIST

## 2023-11-13 PROCEDURE — 36000706: Performed by: SPECIALIST

## 2023-11-13 PROCEDURE — 37766 PR PHLEB VEINS - EXTREM 20+: ICD-10-PCS | Mod: RT,,, | Performed by: SPECIALIST

## 2023-11-13 PROCEDURE — 37766 PHLEB VEINS - EXTREM 20+: CPT | Mod: RT,,, | Performed by: SPECIALIST

## 2023-11-13 PROCEDURE — 36478 PR ENDOVENOUS LASER, 1ST VEIN: ICD-10-PCS | Mod: 51,RT,, | Performed by: SPECIALIST

## 2023-11-13 PROCEDURE — 63600175 PHARM REV CODE 636 W HCPCS: Performed by: SPECIALIST

## 2023-11-13 PROCEDURE — 25000003 PHARM REV CODE 250: Performed by: SPECIALIST

## 2023-11-13 PROCEDURE — 37000009 HC ANESTHESIA EA ADD 15 MINS: Performed by: SPECIALIST

## 2023-11-13 RX ORDER — MEPERIDINE HYDROCHLORIDE 25 MG/ML
12.5 INJECTION INTRAMUSCULAR; INTRAVENOUS; SUBCUTANEOUS EVERY 10 MIN PRN
Status: DISCONTINUED | OUTPATIENT
Start: 2023-11-13 | End: 2023-11-13

## 2023-11-13 RX ORDER — LIDOCAINE HYDROCHLORIDE 10 MG/ML
INJECTION INFILTRATION; PERINEURAL
Status: COMPLETED
Start: 2023-11-13 | End: 2023-11-13

## 2023-11-13 RX ORDER — LIDOCAINE HYDROCHLORIDE AND EPINEPHRINE 5; 5 MG/ML; UG/ML
INJECTION, SOLUTION INFILTRATION; PERINEURAL
Status: DISCONTINUED
Start: 2023-11-13 | End: 2023-11-13 | Stop reason: HOSPADM

## 2023-11-13 RX ORDER — CEFAZOLIN SODIUM 2 G/50ML
2 SOLUTION INTRAVENOUS
Status: DISCONTINUED | OUTPATIENT
Start: 2023-11-13 | End: 2023-11-13

## 2023-11-13 RX ORDER — HYDROMORPHONE HYDROCHLORIDE 2 MG/ML
0.4 INJECTION, SOLUTION INTRAMUSCULAR; INTRAVENOUS; SUBCUTANEOUS EVERY 10 MIN PRN
Status: DISCONTINUED | OUTPATIENT
Start: 2023-11-13 | End: 2023-11-13

## 2023-11-13 RX ORDER — MIDAZOLAM HYDROCHLORIDE 1 MG/ML
INJECTION INTRAMUSCULAR; INTRAVENOUS
Status: COMPLETED
Start: 2023-11-13 | End: 2023-11-13

## 2023-11-13 RX ORDER — METOCLOPRAMIDE HYDROCHLORIDE 5 MG/ML
10 INJECTION INTRAMUSCULAR; INTRAVENOUS EVERY 10 MIN PRN
Status: DISCONTINUED | OUTPATIENT
Start: 2023-11-13 | End: 2023-11-13

## 2023-11-13 RX ORDER — HYDROCODONE BITARTRATE AND ACETAMINOPHEN 5; 325 MG/1; MG/1
1 TABLET ORAL EVERY 4 HOURS PRN
Status: DISCONTINUED | OUTPATIENT
Start: 2023-11-13 | End: 2023-11-13 | Stop reason: HOSPADM

## 2023-11-13 RX ORDER — MIDAZOLAM HYDROCHLORIDE 1 MG/ML
2 INJECTION INTRAMUSCULAR; INTRAVENOUS ONCE
Status: COMPLETED | OUTPATIENT
Start: 2023-11-13 | End: 2023-11-13

## 2023-11-13 RX ORDER — METHOCARBAMOL 100 MG/ML
1000 INJECTION, SOLUTION INTRAMUSCULAR; INTRAVENOUS ONCE AS NEEDED
Status: COMPLETED | OUTPATIENT
Start: 2023-11-13 | End: 2023-11-13

## 2023-11-13 RX ORDER — LIDOCAINE HYDROCHLORIDE 10 MG/ML
INJECTION, SOLUTION EPIDURAL; INFILTRATION; INTRACAUDAL; PERINEURAL
Status: DISCONTINUED | OUTPATIENT
Start: 2023-11-13 | End: 2023-11-13

## 2023-11-13 RX ORDER — LIDOCAINE HYDROCHLORIDE AND EPINEPHRINE 5; 5 MG/ML; UG/ML
INJECTION, SOLUTION INFILTRATION; PERINEURAL
Status: DISCONTINUED | OUTPATIENT
Start: 2023-11-13 | End: 2023-11-13 | Stop reason: HOSPADM

## 2023-11-13 RX ORDER — EPHEDRINE SULFATE 50 MG/ML
INJECTION, SOLUTION INTRAVENOUS
Status: DISCONTINUED | OUTPATIENT
Start: 2023-11-13 | End: 2023-11-13

## 2023-11-13 RX ORDER — CEFAZOLIN SODIUM 1 G/3ML
2 INJECTION, POWDER, FOR SOLUTION INTRAMUSCULAR; INTRAVENOUS ONCE
Status: COMPLETED | OUTPATIENT
Start: 2023-11-13 | End: 2023-11-13

## 2023-11-13 RX ORDER — MUPIROCIN 20 MG/G
OINTMENT TOPICAL
Status: DISCONTINUED | OUTPATIENT
Start: 2023-11-13 | End: 2023-11-13

## 2023-11-13 RX ORDER — DEXAMETHASONE SODIUM PHOSPHATE 4 MG/ML
INJECTION, SOLUTION INTRA-ARTICULAR; INTRALESIONAL; INTRAMUSCULAR; INTRAVENOUS; SOFT TISSUE
Status: DISCONTINUED | OUTPATIENT
Start: 2023-11-13 | End: 2023-11-13

## 2023-11-13 RX ORDER — FENTANYL CITRATE 50 UG/ML
INJECTION, SOLUTION INTRAMUSCULAR; INTRAVENOUS
Status: DISCONTINUED | OUTPATIENT
Start: 2023-11-13 | End: 2023-11-13

## 2023-11-13 RX ORDER — LIDOCAINE HYDROCHLORIDE 10 MG/ML
INJECTION INFILTRATION; PERINEURAL
Status: DISCONTINUED | OUTPATIENT
Start: 2023-11-13 | End: 2023-11-13 | Stop reason: HOSPADM

## 2023-11-13 RX ORDER — SODIUM CHLORIDE, SODIUM LACTATE, POTASSIUM CHLORIDE, CALCIUM CHLORIDE 600; 310; 30; 20 MG/100ML; MG/100ML; MG/100ML; MG/100ML
INJECTION, SOLUTION INTRAVENOUS CONTINUOUS
Status: DISCONTINUED | OUTPATIENT
Start: 2023-11-13 | End: 2023-11-13

## 2023-11-13 RX ORDER — PROPOFOL 10 MG/ML
VIAL (ML) INTRAVENOUS
Status: DISCONTINUED | OUTPATIENT
Start: 2023-11-13 | End: 2023-11-13

## 2023-11-13 RX ORDER — PROCHLORPERAZINE EDISYLATE 5 MG/ML
5 INJECTION INTRAMUSCULAR; INTRAVENOUS EVERY 30 MIN PRN
Status: DISCONTINUED | OUTPATIENT
Start: 2023-11-13 | End: 2023-11-13

## 2023-11-13 RX ORDER — SODIUM CHLORIDE 9 MG/ML
INJECTION, SOLUTION INTRAVENOUS CONTINUOUS
Status: DISCONTINUED | OUTPATIENT
Start: 2023-11-13 | End: 2023-11-13

## 2023-11-13 RX ORDER — ONDANSETRON HYDROCHLORIDE 2 MG/ML
INJECTION, SOLUTION INTRAMUSCULAR; INTRAVENOUS
Status: DISCONTINUED | OUTPATIENT
Start: 2023-11-13 | End: 2023-11-13

## 2023-11-13 RX ORDER — DIPHENHYDRAMINE HYDROCHLORIDE 50 MG/ML
25 INJECTION INTRAMUSCULAR; INTRAVENOUS EVERY 6 HOURS PRN
Status: DISCONTINUED | OUTPATIENT
Start: 2023-11-13 | End: 2023-11-13

## 2023-11-13 RX ADMIN — EPHEDRINE SULFATE 5 MG: 50 INJECTION INTRAVENOUS at 09:11

## 2023-11-13 RX ADMIN — FENTANYL CITRATE 50 MCG: 50 INJECTION, SOLUTION INTRAMUSCULAR; INTRAVENOUS at 08:11

## 2023-11-13 RX ADMIN — EPHEDRINE SULFATE 15 MG: 50 INJECTION INTRAVENOUS at 09:11

## 2023-11-13 RX ADMIN — MIDAZOLAM HYDROCHLORIDE 2 MG: 1 INJECTION, SOLUTION INTRAMUSCULAR; INTRAVENOUS at 07:11

## 2023-11-13 RX ADMIN — LIDOCAINE HYDROCHLORIDE 50 MG: 10 INJECTION, SOLUTION EPIDURAL; INFILTRATION; INTRACAUDAL; PERINEURAL at 08:11

## 2023-11-13 RX ADMIN — EPHEDRINE SULFATE 10 MG: 50 INJECTION INTRAVENOUS at 09:11

## 2023-11-13 RX ADMIN — MIDAZOLAM HYDROCHLORIDE 2 MG: 1 INJECTION INTRAMUSCULAR; INTRAVENOUS at 07:11

## 2023-11-13 RX ADMIN — ONDANSETRON 4 MG: 2 INJECTION INTRAMUSCULAR; INTRAVENOUS at 08:11

## 2023-11-13 RX ADMIN — FENTANYL CITRATE 25 MCG: 50 INJECTION, SOLUTION INTRAMUSCULAR; INTRAVENOUS at 09:11

## 2023-11-13 RX ADMIN — PROPOFOL 150 MG: 10 INJECTION, EMULSION INTRAVENOUS at 08:11

## 2023-11-13 RX ADMIN — EPHEDRINE SULFATE 10 MG: 50 INJECTION INTRAVENOUS at 08:11

## 2023-11-13 RX ADMIN — DEXAMETHASONE SODIUM PHOSPHATE 4 MG: 4 INJECTION, SOLUTION INTRA-ARTICULAR; INTRALESIONAL; INTRAMUSCULAR; INTRAVENOUS; SOFT TISSUE at 08:11

## 2023-11-13 RX ADMIN — MUPIROCIN: 20 OINTMENT TOPICAL at 07:11

## 2023-11-13 RX ADMIN — CEFAZOLIN 2 G: 330 INJECTION, POWDER, FOR SOLUTION INTRAMUSCULAR; INTRAVENOUS at 08:11

## 2023-11-13 RX ADMIN — SODIUM CHLORIDE, POTASSIUM CHLORIDE, SODIUM LACTATE AND CALCIUM CHLORIDE: 600; 310; 30; 20 INJECTION, SOLUTION INTRAVENOUS at 07:11

## 2023-11-13 RX ADMIN — METHOCARBAMOL 1000 MG: 100 INJECTION INTRAMUSCULAR; INTRAVENOUS at 10:11

## 2023-11-13 NOTE — DISCHARGE SUMMARY
[facility] Surgical - Periop Services  Discharge Note  Short Stay    Procedure(s) (LRB):  STAB PHLEBECTOMY, VARICOSE VEIN Right (Right)  ABLATION, VEIN, PERIPHERAL, PERCUTANEOUS, ENDOVENOUS, USING LASER Right (Right)      OUTCOME: Patient tolerated treatment/procedure well without complication and is now ready for discharge.    DISPOSITION: Home or Self Care    FINAL DIAGNOSIS:  Varicose veins of right lower extremity with pain    FOLLOWUP: In clinic    DISCHARGE INSTRUCTIONS:    Discharge Procedure Orders   [facility] general     Keep surgical extremity elevated     Wound care routine (specify)   Order Comments: Leave dermabond in place until it falls off;  Ok to wash with soap under running water but do not submerge.  Do not use antibiotics ointment.     Call MD for:  temperature >100.4     Call MD for:  redness, tenderness, or signs of infection (pain, swelling, redness, odor or green/yellow discharge around incision site)     Activity as tolerated        TIME SPENT ON DISCHARGE: 5 minutes

## 2023-11-13 NOTE — ANESTHESIA POSTPROCEDURE EVALUATION
Anesthesia Post Evaluation    Patient: Esperanza Ochoa    Procedure(s) Performed: Procedure(s) (LRB):  STAB PHLEBECTOMY, VARICOSE VEIN Right (Right)  ABLATION, VEIN, PERIPHERAL, PERCUTANEOUS, ENDOVENOUS, USING LASER Right (Right)    Final Anesthesia Type: general      Patient location during evaluation: PACU  Patient participation: Yes- Able to Participate  Level of consciousness: awake and alert  Post-procedure vital signs: reviewed and stable  Pain management: adequate  Airway patency: patent    PONV status at discharge: No PONV  Anesthetic complications: no      Respiratory status: unassisted  Hydration status: euvolemic  Follow-up not needed.          Vitals Value Taken Time   /82 11/13/23 1155   Temp 36.4 °C (97.6 °F) 11/13/23 1124   Pulse 65 11/13/23 1155   Resp 18 11/13/23 1155   SpO2 94 % 11/13/23 1155         Event Time   Out of Recovery 11:03:00         Pain/Marta Score: Marta Score: 10 (11/13/2023 11:21 AM)

## 2023-11-13 NOTE — ANESTHESIA PREPROCEDURE EVALUATION
2023  Esperanza Ochoa is a 73 y.o., female.    No specialty history recorded    Other Medical History   Hypertension Insomnia   High cholesterol 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, bilateral   Obese Leg pain     Surgical History     SECTION COLONOSCOPY   CARPAL TUNNEL RELEASE TRIGGER FINGER RELEASE   CARPAL TUNNEL RELEASE TUBAL LIGATION     Historical labs ok    Pre-op Assessment    I have reviewed the Patient Summary Reports.     I have reviewed the Nursing Notes. I have reviewed the NPO Status.   I have reviewed the Medications.     Review of Systems  Anesthesia Hx:  No problems with previous Anesthesia                Social:  Non-Smoker       Hematology/Oncology:  Hematology Normal   Oncology Normal                                   EENT/Dental:  EENT/Dental Normal           Cardiovascular:  Exercise tolerance: good   Hypertension                Functional Capacity good / => 4 METS                         Pulmonary:  Pulmonary Normal                       Renal/:  Renal/ Normal                 Hepatic/GI:  Hepatic/GI Normal                 Musculoskeletal:  Musculoskeletal Normal                Neurological:    Neuromuscular Disease,                                   Endocrine:  Endocrine Normal            Dermatological:  Skin Normal    Psych:  Psychiatric Normal                    Physical Exam  General: Alert, Oriented, Well nourished and Cooperative    Airway:  Mallampati: II   Mouth Opening: Normal  TM Distance: Normal  Tongue: Normal  Neck ROM: Normal ROM    Dental:  Intact        Anesthesia Plan  Type of Anesthesia, risks & benefits discussed:    Anesthesia Type: Gen Supraglottic Airway  Intra-op Monitoring Plan: Standard ASA Monitors  Post Op Pain Control Plan: multimodal analgesia  Induction:  IV  Informed Consent: Informed consent signed with the Patient and all parties understand the risks and  agree with anesthesia plan.  All questions answered.   ASA Score: 2  Day of Surgery Review of History & Physical: H&P Update referred to the surgeon/provider.    Ready For Surgery From Anesthesia Perspective.     .

## 2023-11-13 NOTE — PLAN OF CARE
Pt. Resting comfortably, VSS, no s/s distress, Marta at acceptable level for transfer to phase II, Criteria met for anesthesia release per Dr. Garcia.

## 2023-11-13 NOTE — ANESTHESIA PROCEDURE NOTES
Intubation    Date/Time: 11/13/2023 8:25 AM    Performed by: Alena Mckeon CRNA  Authorized by: Ashish Garcia MD    Intubation:     Induction:  Intravenous    Intubated:  Postinduction    Mask Ventilation:  Easy with oral airway    Attempts:  1    Attempted By:  CRNA    Difficult Airway Encountered?: No      Airway Device:  Supraglottic airway/LMA    Airway Device Size:  4.0    Style/Cuff Inflation:  Cuffed (inflated to minimal occlusive pressure)    Inflation Amount (mL):  10    Placement Verified By:  Capnometry    Complicating Factors:  None    Findings Post-Intubation:  BS equal bilateral and atraumatic/condition of teeth unchanged

## 2023-11-13 NOTE — OP NOTE
[facility] Surgical - Periop Services  General Surgery  Operative Note    SUMMARY     Date of Procedure: 11/13/2023     Procedure:    Endovenous laser ablation of the right great saphenous vein  Endovenous laser ablation of the right small saphenous vein  Stab phlebectomy x22 to right lower leg     Surgeon(s) and Role:     * Reno Wilder MD - Primary    Assisting Surgeon: None    Pre-Operative Diagnosis: Varicose veins of right lower extremity with pain [I83.811]    Post-Operative Diagnosis: Post-Op Diagnosis Codes:     * Varicose veins of right lower extremity with pain [I83.811]    Anesthesia: General    Operative Findings (including complications, if any):  Successful right great saphenous and short saphenous laser vein ablation.  Stab phlebectomy times 22 to right lower leg with variable vein segment retrieval but good result overall.     Description of Technical Procedures:  Mrs. Ochoa was taken to the operating room and placed in a supine position where general anesthesia was initiated.  Her right groin and right leg were prepped and draped in the usual sterile fashion.  The right great saphenous vein was identified and marked through its course.  A site was selected just inferior to the knee and this was utilized as our access point to the great saphenous vein.  A mini stick needle was utilized to cannulate the vein a mini stick wire was placed and then our sheath was placed.  We then advanced our laser catheter into the great saphenous vein and positioned it near the saphenofemoral femoral junction.  Measurements were taken and our catheter tip was a proximally 1.5 cm from the common femoral vein junction.  We then infiltrated a solution about the saphenous vein.  This solution was a mix of saline lidocaine and some epinephrine.  After distributing this fluid well around the saphenous vein we then confirmed our catheter tip location.  Images were retained of that catheter location.   Additionally we did check the common femoral vein which was easily compressible.  We activated our device and performed ablation of the great saphenous vein over the course approximately 42 cm.  This was about 21,000 joules.  We removed our sheath and the catheter and pressure was held at the site.  The common femoral vein was again confirmed to be easily compressible without abnormality.  Images were again retained.  We then turned our attention to the short saphenous vein there was only a short segment suitable for treatment.  We are able to access the short saphenous vein and a manner similar to that previously described.  We advanced our catheter.  Secondary to tortuosity it was not able to advance to the level of the popliteal vein.  After ensuring was in a suitable location we then performed infiltration about the short saphenous vein and then proceeded with ablation.  We only ablated a segment of approximately 7-8 cm.    We then turned our attention to stab phlebectomies.  The varicose veins to the right lower leg in the anterior medial and posterior aspect were marked prior to arriving in the operating room.  At each site we utilized an 18 gauge needle to make a small skin incision and then used utilized our vein hook to control short segments of the varicose veins.  Utilizing hemostats we are able to remove the segments with a traction avulsion technique.  We made a total of 22 micro stab phlebectomies.  Each of our incision sites were covered with the Steri-Strips the leg was covered with stocking net Coban and a graduated compression stocking.  This completed our procedure.    Significant Surgical Tasks Conducted by the Assistant(s), if Applicable: none    Estimated Blood Loss (EBL): less than 30ml           Implants: * No implants in log *    Specimens:   Specimen (24h ago, onward)       Start     Ordered    11/13/23 0958  Specimen to Pathology  RELEASE UPON ORDERING        References:    Click here for  ordering Quick Tip   Question:  Release to patient  Answer:  Immediate    11/13/23 0958                            Condition: Good    Disposition: PACU - hemodynamically stable.    Attestation: I was present and scrubbed for the entire procedure.

## 2023-11-13 NOTE — DISCHARGE INSTRUCTIONS
"Patient Education    Vein Ablation   What is vein ablation? -- Vein ablation is a procedure that is used to treat different types of vein disease. It works by sealing up or removing a diseased vein, so that blood cannot flow through it anymore. The blood that would normally flow through that vein finds another route back to the heart.  What happens after vein ablation? -- After having a vein ablation procedure, most people are asked to:  Walk around for a few minutes multiple times a day  Avoid sitting or standing for too long  Prop up their leg while sitting (for example on pillows)  Avoid heavy lifting or exercise for 1 to 2 weeks after the procedure  Depending on the type of ablation procedure you have, you might be asked to wear special bandages or stockings called "compression bandages" or "compression stockings." These bandages or stockings will put steady pressure on your leg and can help prevent bruising.  Also depending on what type of procedure you have, you might have to go back to the doctor a few days after your procedure for a test called an ultrasound. This is to make sure you have not developed any blood clots in your leg. It is very important to get this test if your doctor tells you to.  What problems can happen after vein ablation? -- The problems that can happen are different for the different procedures. In general, the problems can include:  Blood clots in the legs  Bleeding under the skin that forms a soft lump called a "hematoma"  People who have ablation procedures can also have:  Wound infections  Nerve damage   Over time, it's possible for a vein that has been sealed to open back up again. This is uncommon with thermal ablation procedures, but has happened. With vein ligation or stripping, the diseased vein is gone, but sometimes other veins in the area become diseased.  When should I call my doctor or nurse? -- Call your doctor or nurse right away if:  You wear bandages or stockings and " notice signs that they might be too tight - For example, they might be too tight if your leg or foot tingles or feels cool, or if the toes on the treated side turn white or purple. You can remove the bandages or stockings if this happens, but call your doctor or nurse, too.  You have intense pain or swelling that does not get better with the pain medicines your doctor prescribed - These symptoms could be a sign that you have a clot in your leg.  You bleed through your bandages even after the doctor has adjusted them, or you bleed and soak your bandages.  figure 1: Thermal vein ablation     During thermal vein ablation, the doctor inserts a special catheter into the diseased vein. The catheter heats up the vein from the inside and seals it closed. Thermal ablation can use radiofrequency or laser energy.  Graphic 29540 Version 3.0

## 2023-11-13 NOTE — TRANSFER OF CARE
"Anesthesia Transfer of Care Note    Patient: Esperanza Ochoa    Procedure(s) Performed: Procedure(s) (LRB):  STAB PHLEBECTOMY, VARICOSE VEIN Right (Right)  ABLATION, VEIN, PERIPHERAL, PERCUTANEOUS, ENDOVENOUS, USING LASER Right (Right)    Patient location: PACU    Anesthesia Type: general    Transport from OR: Transported from OR on room air with adequate spontaneous ventilation    Post pain: adequate analgesia    Post assessment: no apparent anesthetic complications and tolerated procedure well    Post vital signs: stable    Level of consciousness: responds to stimulation    Nausea/Vomiting: no nausea/vomiting    Complications: none    Transfer of care protocol was followed      Last vitals: Visit Vitals  /66 (BP Location: Right arm, Patient Position: Lying)   Pulse 67   Temp 36.7 °C (98.1 °F) (Tympanic)   Resp 16   Ht 5' 3" (1.6 m)   Wt 86.5 kg (190 lb 11.2 oz)   SpO2 97%   Breastfeeding No   BMI 33.78 kg/m²     "

## 2023-11-14 VITALS
TEMPERATURE: 98 F | SYSTOLIC BLOOD PRESSURE: 159 MMHG | BODY MASS INDEX: 33.79 KG/M2 | WEIGHT: 190.69 LBS | HEART RATE: 65 BPM | RESPIRATION RATE: 18 BRPM | OXYGEN SATURATION: 94 % | DIASTOLIC BLOOD PRESSURE: 82 MMHG | HEIGHT: 63 IN

## 2023-11-14 LAB — PSYCHE PATHOLOGY RESULT: NORMAL

## 2023-11-16 DIAGNOSIS — I83.811 VARICOSE VEINS OF RIGHT LOWER EXTREMITY WITH PAIN: Primary | ICD-10-CM

## 2023-11-16 NOTE — PROGRESS NOTES
11/16/23 right leg venous ultrasound reviewed by Dr Wilder. No evidence of DVT or post-operative complication. Maintain 6 week follow up with repeat venous for evaluation of post-operative EVLT.

## 2023-12-27 NOTE — PROGRESS NOTES
"    [facility] [place] [facility] - Clinic Note  Reno Wilder MD      Patient Name: Esperanza Ochoa                   : 1950      MRN: [id number]   Visit Date: 2024       History Present Illness     Reason for Visit: Post-operative Follow up  and Varicose Veins    Ms. Ochoa presents to the clinic for 6 week follow up s/p right great saphenous vein endovenous laser ablation with stab phlebectomies on 23. Venous ultrasound obtained today. She denies fevers, drainage from the incision, worsening pain or swelling.  She reports improvement in her right leg symptoms. She states her swelling is improved since surgery. She notes one episode of right lower leg pain for an hour at the end of the day - approximately 2 weeks ago.  It hasn't recurred since that time. She states her bruising has gotten better.       REVIEW OF SYSTEMS:  12 point review of systems conducted, negative except as stated in the history of present illness. See HPI for details.        Physical Exam      Vitals:    24 0836 24 0837   BP: 125/82 119/78   BP Location: Right arm Left arm   Pulse: 69 (!) 57   Weight:  84.8 kg (187 lb)   Height:  5' 3" (1.6 m)          General: well-nourished, no acute distress, and healthy appearing, ambulating normally, alert, pleasant, conversant, and oriented  Neurologic: cranial nerves are grossly intact, no neurologic deficits, no motor deficits, and no sensory deficits  HEENT: grossly normal and no scleral icterus  Neck/Chest: normal  and soft without lymphadenopathy  Respiratory: breathing easily and without respiratory distress  Abdomen: normal and soft  Cardiology: regular rate and rhythm    Musculoskeletal:   Lower Extremity: no edema present to bilateral lower extremities    Post Operative Incision:   Location: right leg  clean, dry, no drainage, and healing appropriately                          Assessment and Plan     Ms. Ochoa is a 73 y.o. woman who is s /p right great saphenous vein " endovenous laser ablation with stab phlebectomies (x 22) on 23 secondary to symptomatic varicose veins. Venous duplex obtained today demonstrates successfully ablated right great and small saphenous vein without evidence of deep vein thrombosis. Her stab sites are healing appropriately and she has gained improvement in her leg swelling. She can follow up as needed.        1. 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, bilateral      Past Surgical History:   Procedure Laterality Date    CARPAL TUNNEL RELEASE Right 2023    Procedure: RELEASE, CARPAL TUNNEL;  Surgeon: Omer Mcduffie Jr., MD;  Location: [facility] OR;  Service: Orthopedics;  Laterality: Right;    CARPAL TUNNEL RELEASE Left 2023    Procedure: RELEASE, CARPAL TUNNEL;  Surgeon: Omer Mcduffie Jr., MD;  Location: [facility] OR;  Service: Orthopedics;  Laterality: Left;     SECTION  1973    77,81,83    COLONOSCOPY      PERCUTANEOUS ENDOVENOUS LASER ABLATION OF PERIPHERAL VEIN Right 2023    Procedure: ABLATION, VEIN, PERIPHERAL, PERCUTANEOUS, ENDOVENOUS, USING LASER Right;  Surgeon: Reno Wilder MD;  Location: [facility] OR;  Service: Peripheral Vascular;  Laterality: Right;    STAB PHLEBECTOMY OF VARICOSE VEINS Right 2023    Procedure: STAB PHLEBECTOMY, VARICOSE VEIN Right;  Surgeon: Reno Wilder MD;  Location: [facility] OR;  Service: Peripheral Vascular;  Laterality: Right;  22 stabs    TRIGGER FINGER RELEASE Right 2023    Procedure: RELEASE, TRIGGER FINGER;  Surgeon: Omer Mcduffie Jr., MD;  Location: [facility] OR;  Service: Orthopedics;  Laterality: Right;    TUBAL LIGATION       Family History   Problem Relation Age of Onset    No Known Problems Mother     No  Known Problems Father      Social History     Socioeconomic History    Marital status:    Tobacco Use    Smoking status: Never    Smokeless tobacco: Never   Substance and Sexual Activity    Alcohol use: Never    Drug use: Never    Sexual activity: Yes     Partners: Male     Current Outpatient Medications   Medication Instructions    carvediloL (COREG) 25 mg, Oral, 2 times daily    cholecalciferol (vitamin D3) (DECARA) 1,000 Units, Oral, Every 7 days    diphenhydrAMINE (BENADRYL) 50 mg, Oral, Nightly    diphenhydrAMINE-acetaminophen (TYLENOL PM)  mg Tab 2 tablets, Oral, Nightly    omega-3s/dha/epa/fish oil/D3 (VITAMIN-D + OMEGA-3 ORAL) 1 tablet, Oral, Daily, Will stop om 9/17/23     simvastatin (ZOCOR) 10 mg, Oral, Nightly    spironolactone (ALDACTONE) 12.5 mg, Oral, Daily    turmeric root extract 500 mg Cap 1 tablet, Oral, Once as needed, Will stop on 9/17//23      Review of patient's allergies indicates:   Allergen Reactions    Benzalkonium Rash     Methiolate is another name-blisters       Patient Care Team:  Familia Busby MD as PCP - General (Family Medicine)        No follow-ups on file. In addition to their scheduled follow up, the patient has also been instructed to follow up on as needed basis.     No future appointments.

## 2024-01-02 ENCOUNTER — OFFICE VISIT (OUTPATIENT)
Dept: VASCULAR SURGERY | Facility: CLINIC | Age: 74
End: 2024-01-02
Attending: SPECIALIST
Payer: MEDICARE

## 2024-01-02 VITALS
SYSTOLIC BLOOD PRESSURE: 119 MMHG | HEART RATE: 57 BPM | BODY MASS INDEX: 33.13 KG/M2 | HEIGHT: 63 IN | WEIGHT: 187 LBS | DIASTOLIC BLOOD PRESSURE: 78 MMHG

## 2024-01-02 DIAGNOSIS — I83.811 VARICOSE VEINS OF RIGHT LOWER EXTREMITY WITH PAIN: Primary | ICD-10-CM

## 2024-01-02 PROCEDURE — 1101F PT FALLS ASSESS-DOCD LE1/YR: CPT | Mod: CPTII,,, | Performed by: SPECIALIST

## 2024-01-02 PROCEDURE — 3074F SYST BP LT 130 MM HG: CPT | Mod: CPTII,,, | Performed by: SPECIALIST

## 2024-01-02 PROCEDURE — 3288F FALL RISK ASSESSMENT DOCD: CPT | Mod: CPTII,,, | Performed by: SPECIALIST

## 2024-01-02 PROCEDURE — 1160F RVW MEDS BY RX/DR IN RCRD: CPT | Mod: CPTII,,, | Performed by: SPECIALIST

## 2024-01-02 PROCEDURE — 1159F MED LIST DOCD IN RCRD: CPT | Mod: CPTII,,, | Performed by: SPECIALIST

## 2024-01-02 PROCEDURE — 3008F BODY MASS INDEX DOCD: CPT | Mod: CPTII,,, | Performed by: SPECIALIST

## 2024-01-02 PROCEDURE — 99213 OFFICE O/P EST LOW 20 MIN: CPT | Mod: ,,, | Performed by: SPECIALIST

## 2024-01-02 PROCEDURE — 3078F DIAST BP <80 MM HG: CPT | Mod: CPTII,,, | Performed by: SPECIALIST

## (undated) DEVICE — SUPPORT ULNA NERVE PROTECTOR

## (undated) DEVICE — KIT SURGICAL TURNOVER

## (undated) DEVICE — NDL SYR 10ML 18X1.5 LL BLUNT

## (undated) DEVICE — ELECTRODE PATIENT RETURN DISP

## (undated) DEVICE — GLOVE PROTEXIS HYDROGEL SZ8

## (undated) DEVICE — GLOVE PROTEXIS HYDROGEL SZ6

## (undated) DEVICE — GAUZE SPONGE 4X4 12PLY

## (undated) DEVICE — GLOVE PROTEXIS HYDROGEL SZ6.5

## (undated) DEVICE — GLOVE PROTEXIS BLUE LATEX 6.5

## (undated) DEVICE — GOWN POLY REINF X-LONG 2XL

## (undated) DEVICE — PEROXIDE HYDROGEN 3% 16OZ

## (undated) DEVICE — SYR 10CC LUER LOCK

## (undated) DEVICE — DRAPE HAND STERILE

## (undated) DEVICE — DRAPE STERI U-SHAPED 47X51IN

## (undated) DEVICE — SOL NACL IRR 1000ML BTL

## (undated) DEVICE — STOCKINETTE IMPERVIOUS LARGE

## (undated) DEVICE — CUFF ATS 2 PORT SNGL BLDR 18IN

## (undated) DEVICE — GEL AQUASONIC 100 STERILE20GM

## (undated) DEVICE — KIT MINI STICK MAX 5F 21GX7CM

## (undated) DEVICE — SPLINT FIBERGLASS PAD 3X15

## (undated) DEVICE — Device

## (undated) DEVICE — GLOVE SENSICARE PI GRN 6

## (undated) DEVICE — NDL HYPO REG 25G X 1 1/2

## (undated) DEVICE — CLOSURE SKIN STERI STRIP 1/2X4

## (undated) DEVICE — DRAPE FULL SHEET 70X100IN

## (undated) DEVICE — DRESSING XEROFORM FOIL PK 1X8

## (undated) DEVICE — BANDAGE MATRIX HK LOOP 4IN 5YD

## (undated) DEVICE — CAST PLSTR SPLINT X-FAST 3X15

## (undated) DEVICE — PADDING CAST AST SOFT-ROLL 3X4

## (undated) DEVICE — ELECTRODE CAUTER TIP 2.75IN

## (undated) DEVICE — GLOVE PROTEXIS LTX MICRO 8

## (undated) DEVICE — SUT CHROMIC 3-0 SH 27IN GUT

## (undated) DEVICE — BLADE SURG STAINLESS STEEL #11

## (undated) DEVICE — NDL MAGELLAN SAFETY 18G 1.5IN

## (undated) DEVICE — APPLICATOR CHLORAPREP ORN 26ML

## (undated) DEVICE — PAD CAST SPECIALIST STRL 4

## (undated) DEVICE — NDL SAFETY 25G X 1.5 ECLIPSE

## (undated) DEVICE — BNDG COFLEX FOAM LF2 ST 4X5YD

## (undated) DEVICE — SPONGE GAUZE 16PLY 4X4

## (undated) DEVICE — TUBING INFILTRATION SNG SPIKE

## (undated) DEVICE — BLADE SURG STAINLESS STEEL #15

## (undated) DEVICE — BANDAGE MATRIX HK LOOP 3IN 5YD

## (undated) DEVICE — SUT 3-0 12-18IN SILK

## (undated) DEVICE — NDL ECLIPSE SAFETY 18GX1-1/2IN

## (undated) DEVICE — DRAPE U-DRAPE ADHESIVE 60X60IN

## (undated) DEVICE — SOL LAC RINGERS 500CC

## (undated) DEVICE — BANDAGE ACE NON LATEX 3IN

## (undated) DEVICE — BANDAGE VELCLOSE ELAS 3INX5YD

## (undated) DEVICE — SLING ARM MEDIUM FOAM STRAP

## (undated) DEVICE — DRAPE INCISE IOBAN 2 23X23IN

## (undated) DEVICE — BANDAGE STRETCH COHES 6INX5YD

## (undated) DEVICE — BLANKET SNUGGLE WARM UPPER BDY

## (undated) DEVICE — SPLINT PLASTER F.S. 3INX15IN

## (undated) DEVICE — GLOVE SIGNATURE MICRO LTX 6

## (undated) DEVICE — TOURNIQUET SB QC DP 18X4IN